# Patient Record
Sex: MALE | Race: WHITE | NOT HISPANIC OR LATINO | Employment: UNEMPLOYED | URBAN - METROPOLITAN AREA
[De-identification: names, ages, dates, MRNs, and addresses within clinical notes are randomized per-mention and may not be internally consistent; named-entity substitution may affect disease eponyms.]

---

## 2019-07-24 ENCOUNTER — TELEPHONE (OUTPATIENT)
Dept: ADMINISTRATIVE | Facility: HOSPITAL | Age: 54
End: 2019-07-24

## 2019-07-24 ENCOUNTER — PATIENT OUTREACH (OUTPATIENT)
Dept: ADMINISTRATIVE | Facility: HOSPITAL | Age: 54
End: 2019-07-24

## 2020-09-03 ENCOUNTER — HOSPITAL ENCOUNTER (EMERGENCY)
Facility: HOSPITAL | Age: 55
Discharge: HOME | End: 2020-09-03
Attending: EMERGENCY MEDICINE
Payer: MEDICAID

## 2020-09-03 VITALS
SYSTOLIC BLOOD PRESSURE: 107 MMHG | DIASTOLIC BLOOD PRESSURE: 82 MMHG | OXYGEN SATURATION: 96 % | WEIGHT: 204 LBS | BODY MASS INDEX: 32.02 KG/M2 | RESPIRATION RATE: 18 BRPM | HEIGHT: 67 IN | HEART RATE: 83 BPM | TEMPERATURE: 100 F

## 2020-09-03 DIAGNOSIS — R05.9 COUGH: Primary | ICD-10-CM

## 2020-09-03 DIAGNOSIS — R06.02 SOB (SHORTNESS OF BREATH): ICD-10-CM

## 2020-09-03 LAB — SARS-COV-2 RNA RESP QL NAA+PROBE: NOT DETECTED

## 2020-09-03 PROCEDURE — 99285 EMERGENCY DEPT VISIT HI MDM: CPT | Mod: 25

## 2020-09-03 PROCEDURE — 25000242 PHARM REV CODE 250 ALT 637 W/ HCPCS: Performed by: EMERGENCY MEDICINE

## 2020-09-03 PROCEDURE — U0002 COVID-19 LAB TEST NON-CDC: HCPCS

## 2020-09-03 PROCEDURE — 93010 EKG 12-LEAD: ICD-10-PCS | Mod: ,,, | Performed by: INTERNAL MEDICINE

## 2020-09-03 PROCEDURE — 93010 ELECTROCARDIOGRAM REPORT: CPT | Mod: ,,, | Performed by: INTERNAL MEDICINE

## 2020-09-03 PROCEDURE — 93005 ELECTROCARDIOGRAM TRACING: CPT

## 2020-09-03 RX ORDER — ALBUTEROL SULFATE 90 UG/1
4 AEROSOL, METERED RESPIRATORY (INHALATION) ONCE
Status: COMPLETED | OUTPATIENT
Start: 2020-09-03 | End: 2020-09-03

## 2020-09-03 RX ORDER — FLUTICASONE PROPIONATE 50 MCG
1 SPRAY, SUSPENSION (ML) NASAL 2 TIMES DAILY PRN
Qty: 15 G | Refills: 0 | Status: SHIPPED | OUTPATIENT
Start: 2020-09-03 | End: 2022-05-10

## 2020-09-03 RX ORDER — BENZONATATE 100 MG/1
100 CAPSULE ORAL 3 TIMES DAILY PRN
Qty: 20 CAPSULE | Refills: 0 | Status: SHIPPED | OUTPATIENT
Start: 2020-09-03 | End: 2020-09-13

## 2020-09-03 RX ORDER — NALOXONE HCL 0.4 MG/ML
VIAL (ML) INJECTION
Status: COMPLETED
Start: 2020-09-03 | End: 2020-09-03

## 2020-09-03 RX ADMIN — ALBUTEROL SULFATE 4 PUFF: 90 AEROSOL, METERED RESPIRATORY (INHALATION) at 08:09

## 2020-09-03 NOTE — ED PROVIDER NOTES
Encounter Date: 9/3/2020       History     Chief Complaint   Patient presents with    Cough     It started out with an itchy throat and chest congestion.       HPI     55 y.o.male with PMHx tobacco abuse presents with cough, SOB, nasal congestion x 3 days. Pt reports he felt congested and had an itchy throat three days ago and reports having worsening chest congestion, cough, and some chills in the last day and was barely able to sleep last night.  He reports living with his elderly parents who are both not sick.  He reports coughing up clear sputum, and took an deena seltzer with some relief.  He reports otherwise not taking any medications or any further OTC medications.  He denies any chest pain, n/v/d/c, abdominal pain, lightheadedness/dizziness, headache, rashes, or any further complaints.    Review of patient's allergies indicates:  No Known Allergies  History reviewed. No pertinent past medical history.  Past Surgical History:   Procedure Laterality Date    APPENDECTOMY      COLONOSCOPY N/A 9/30/2016    Procedure: COLONOSCOPY;  Surgeon: Luis Bogran-Reyes, MD;  Location: [facility];  Service: Endoscopy;  Laterality: N/A;    HERNIA REPAIR       Family History   Problem Relation Age of Onset    Cancer Mother     Diabetes Father     Hypertension Father      Social History     Tobacco Use    Smoking status: Former Smoker     Start date: 1988    Smokeless tobacco: Current User     Types: Snuff    Tobacco comment: social   Substance Use Topics    Alcohol use: Yes     Alcohol/week: 0.0 standard drinks     Comment: occasional    Drug use: No     Review of Systems   Constitutional: Positive for chills.   HENT: Positive for congestion.    Eyes: Negative.    Respiratory: Positive for cough and shortness of breath.    Cardiovascular: Negative.    Gastrointestinal: Negative.    Genitourinary: Negative.    Musculoskeletal: Negative.    Skin: Negative.    Neurological: Negative.        Physical Exam     Initial  Vitals   BP Pulse Resp Temp SpO2   09/03/20 0803 09/03/20 0801 09/03/20 0801 09/03/20 0801 09/03/20 0801   107/82 83 18 99.7 °F (37.6 °C) (!) 94 %      MAP       --                Physical Exam    Nursing note and vitals reviewed.  Constitutional: He appears well-developed and well-nourished. He is not diaphoretic. No distress.   HENT:   Head: Normocephalic and atraumatic.   Nose: Nose normal.   Mouth/Throat: No oropharyngeal exudate.   Eyes: EOM are normal. Pupils are equal, round, and reactive to light.   Neck: Normal range of motion. Neck supple. No tracheal deviation present. No JVD present.   Cardiovascular: Normal rate, regular rhythm and normal heart sounds.   No murmur heard.  Pulmonary/Chest: No respiratory distress. He has wheezes (mild noted bilateral lower lobes). He has no rhonchi. He has no rales.   Abdominal: Soft. Bowel sounds are normal. He exhibits no distension. There is no abdominal tenderness. There is no rebound and no guarding.   Musculoskeletal: Normal range of motion. No tenderness or edema.   Neurological: He is alert and oriented to person, place, and time. He has normal strength. No cranial nerve deficit.   Skin: Skin is warm and dry. Capillary refill takes less than 2 seconds. No rash noted. No erythema.         ED Course   Procedures  Labs Reviewed   SARS-COV-2 (COVID-19) QUALITATIVE PCR    Narrative:     Is this needed for pre-procedure or pre-op testing?->No          Imaging Results          X-Ray Chest 1 View (Final result)  Result time 09/03/20 09:10:20    Final result by Dandy Zapata MD (09/03/20 09:10:20)                 Impression:      No acute finding detected.      Electronically signed by: Dandy Zapata MD  Date:    09/03/2020  Time:    09:10             Narrative:    EXAMINATION:  XR CHEST 1 VIEW    CLINICAL HISTORY:  Shortness of breath    TECHNIQUE:  Portable AP chest    COMPARISON:  No priors available    FINDINGS:  No cardiac silhouette enlargement. Pulmonary vasculature  unremarkable. No consolidation or evidence of pneumothorax. No acute osseous change.                                                MDM:    55 y.o.male with PMHx tobacco abuse presents with cough, SOB, nasal congestion x 3 days.  Physical exam remarkable for well appearing male, in NAD, conversign with ease, mild wheezing noted in bilateral lower lobes, no peripheral edema noted, no JVD noted.  ED workup remarkable for EKG - nsr, rate 69 bpm, no ischemic pattern noted, no STEMI, CXR - unremarkable, COVID - negative.  Pt presentation consistent with viral URI with nasal congestion and relief after albuterol administration via hfa. Pt advised on further sxm therapies at home including further use of albuterol hfa.  At this time given patient's history, physical exam, and ED workup do not suspect arrhythmia, CHF exacerbation, COPD exacerbation, cardiac tamponade, MI/ACS, PE, PTX, aortic dissection, pericarditis, PNA, or any further malignant cause. Discussed diagnosis and further treatment with patient, return precautions given and all questions answered.  Patient in understanding of plan.  Pt discharged to home improved and stable.                        Clinical Impression:       ICD-10-CM ICD-9-CM   1. Cough  R05 786.2   2. SOB (shortness of breath)  R06.02 786.05             ED Disposition Condition    Discharge Stable        ED Prescriptions     Medication Sig Dispense Start Date End Date Auth. Provider    xqbmislpr-KZ-bdgntfkm-guaifen (SUDAFED PE PRESSURE-PAIN-COLD) 5--100 mg Tab Take 1 tablet by mouth every 8 (eight) hours. for 10 days 30 each 9/3/2020 9/13/2020 Ivan Ingram MD    fluticasone propionate (FLONASE) 50 mcg/actuation nasal spray 1 spray (50 mcg total) by Each Nostril route 2 (two) times daily as needed for Rhinitis. 15 g 9/3/2020  Ivan Ingram MD    benzonatate (TESSALON) 100 MG capsule Take 1 capsule (100 mg total) by mouth 3 (three) times daily as needed for Cough. 20 capsule  9/3/2020 9/13/2020 Ivan Ingram MD        Follow-up Information     Follow up With Specialties Details Why Contact Info Additional Information    [facility] - Emergency Department Emergency Medicine Go to  If symptoms worsen [address] [place] [address] [id number] [phone] Floor 1    Kaleigh Mueller MD Family Medicine, Pediatrics, Emergency Medicine Schedule an appointment as soon as possible for a visit in 3 days As needed [address] [facility] [address]  [phone]                                        Ivan Ingram MD  09/03/20 1017

## 2020-09-03 NOTE — ED NOTES
NEUROLOGICAL:   Patient is awake , alert  and oriented x 4 . Pupils are PERRL. Gait is steady.   Moves all extremities without difficulty.   Patient reports no neuro complaints..  GCS 15    HEENT:   Head appears normocephalic  and symmetric .   Eyes appear WNL to both eyes. Patient reports no complaints  to both eyes .   Ears appear WNL. Patient reports no complaints  to both ears.   Nares appear patent . Patient reports no nose complaints .  Mouth appears moist, pink and teeth intact. Patient reports no mouth complaints.   Throat appears pink and moist . Patient reports sore throat.    CARDIOVASCULAR:   S1 and S2 present, no murmurs, gallops, or rubs, rate regular  and pulses palpable (2+)    On palpation no edema noted , noted to none.   Patient reports no CV complaints.  .   Patient vitals are WNL.    RESPIRATORY:   Airway Clear, Open, and Patent.  Respirations are even and unlabored.   Breath sounds clear  to all lung fields.   Patient reports productive cough.     GASTROINTESTINAL:   Abdomen is soft  and non-tender x 4 quadrants. Bowel sounds are normoactive to all quadrants .   Patient reports no GI complaints .     GENITOURINARY:   Patient reports no  complaints.     MUSCULOSKELETAL:   full range of motion to all extremities, no swelling noted , no tenderness noted and no weakness noted.   Patient reports no musculoskeletal complaints     SKIN:   Skin appears warm , dry , good turgor, color normal for race and intact. Patient reports no skin complaints.

## 2021-04-24 ENCOUNTER — HOSPITAL ENCOUNTER (EMERGENCY)
Facility: HOSPITAL | Age: 56
Discharge: HOME | End: 2021-04-24
Attending: EMERGENCY MEDICINE
Payer: MEDICAID

## 2021-04-24 VITALS
SYSTOLIC BLOOD PRESSURE: 121 MMHG | HEART RATE: 89 BPM | BODY MASS INDEX: 33.49 KG/M2 | DIASTOLIC BLOOD PRESSURE: 85 MMHG | OXYGEN SATURATION: 97 % | TEMPERATURE: 99 F | WEIGHT: 208.38 LBS | RESPIRATION RATE: 17 BRPM | HEIGHT: 66 IN

## 2021-04-24 DIAGNOSIS — S93.402A SPRAIN OF LEFT ANKLE, UNSPECIFIED LIGAMENT, INITIAL ENCOUNTER: Primary | ICD-10-CM

## 2021-04-24 DIAGNOSIS — R52 PAIN: ICD-10-CM

## 2021-04-24 PROCEDURE — 99283 EMERGENCY DEPT VISIT LOW MDM: CPT | Mod: 25

## 2021-05-06 ENCOUNTER — PATIENT MESSAGE (OUTPATIENT)
Dept: RESEARCH | Facility: HOSPITAL | Age: 56
End: 2021-05-06

## 2021-06-03 ENCOUNTER — HOSPITAL ENCOUNTER (EMERGENCY)
Facility: HOSPITAL | Age: 56
Discharge: HOME | End: 2021-06-03
Attending: EMERGENCY MEDICINE
Payer: MEDICAID

## 2021-06-03 VITALS
HEART RATE: 74 BPM | TEMPERATURE: 98 F | BODY MASS INDEX: 33.75 KG/M2 | RESPIRATION RATE: 18 BRPM | SYSTOLIC BLOOD PRESSURE: 116 MMHG | WEIGHT: 210 LBS | OXYGEN SATURATION: 96 % | DIASTOLIC BLOOD PRESSURE: 77 MMHG | HEIGHT: 66 IN

## 2021-06-03 DIAGNOSIS — S92.416A: Primary | ICD-10-CM

## 2021-06-03 PROCEDURE — 99283 EMERGENCY DEPT VISIT LOW MDM: CPT | Mod: 25

## 2021-06-03 RX ORDER — TRAMADOL HYDROCHLORIDE 50 MG/1
50 TABLET ORAL EVERY 6 HOURS PRN
Qty: 12 TABLET | Refills: 0 | Status: SHIPPED | OUTPATIENT
Start: 2021-06-03 | End: 2022-05-10

## 2022-02-07 ENCOUNTER — HOSPITAL ENCOUNTER (EMERGENCY)
Facility: HOSPITAL | Age: 57
Discharge: HOME | End: 2022-02-07
Attending: EMERGENCY MEDICINE
Payer: MEDICAID

## 2022-02-07 VITALS
WEIGHT: 223 LBS | BODY MASS INDEX: 35.84 KG/M2 | HEART RATE: 69 BPM | SYSTOLIC BLOOD PRESSURE: 157 MMHG | OXYGEN SATURATION: 98 % | DIASTOLIC BLOOD PRESSURE: 93 MMHG | TEMPERATURE: 99 F | HEIGHT: 66 IN | RESPIRATION RATE: 18 BRPM

## 2022-02-07 DIAGNOSIS — T14.90XA INJURY: ICD-10-CM

## 2022-02-07 DIAGNOSIS — M54.50 MIDLINE LOW BACK PAIN WITHOUT SCIATICA: ICD-10-CM

## 2022-02-07 DIAGNOSIS — S93.401A SPRAIN OF RIGHT ANKLE, UNSPECIFIED LIGAMENT, INITIAL ENCOUNTER: Primary | ICD-10-CM

## 2022-02-07 PROCEDURE — 99283 EMERGENCY DEPT VISIT LOW MDM: CPT | Mod: 25

## 2022-02-07 RX ORDER — NAPROXEN 500 MG/1
500 TABLET ORAL 2 TIMES DAILY WITH MEALS
Qty: 10 TABLET | Refills: 0 | Status: SHIPPED | OUTPATIENT
Start: 2022-02-07 | End: 2022-02-12

## 2022-02-07 NOTE — Clinical Note
"Bob Christiansen" Mango was seen and treated in our emergency department on 2/7/2022.  He may return to work on 02/08/2022.       If you have any questions or concerns, please don't hesitate to call.      Haleigh Slater NP"

## 2022-02-07 NOTE — ED PROVIDER NOTES
Encounter Date: 2/7/2022       History     Chief Complaint   Patient presents with    Ankle Pain     Pt stated that he has been having right ankle pain for the past month. About a week ago, he was coming off of his step and rolled right ankle. Presents to ED for complaints of pain.      This is a 56-year-old white male with noncontributory past medical history who presents to the emergency department with complaints of right ankle pain.  Patient relates that over the last month he has been experiencing intermittent, nontraumatic right ankle pain that became worse 1 week ago after twisting it .  Patient reports pain to the dorsal and medial aspect of the right ankle is worse with ambulation.  Patient denies any other concerns at this time.        Review of patient's allergies indicates:  No Known Allergies  History reviewed. No pertinent past medical history.  Past Surgical History:   Procedure Laterality Date    APPENDECTOMY      COLONOSCOPY N/A 9/30/2016    Procedure: COLONOSCOPY;  Surgeon: Luis Bogran-Reyes, MD;  Location: [facility];  Service: Endoscopy;  Laterality: N/A;    HERNIA REPAIR       Family History   Problem Relation Age of Onset    Cancer Mother     Diabetes Father     Hypertension Father      Social History     Tobacco Use    Smoking status: Former Smoker     Start date: 1988    Smokeless tobacco: Current User     Types: Snuff    Tobacco comment: social   Substance Use Topics    Alcohol use: Yes     Alcohol/week: 0.0 standard drinks     Comment: occasional    Drug use: No     Review of Systems   Constitutional: Negative.    Respiratory: Negative.    Cardiovascular: Negative.    Musculoskeletal: Positive for arthralgias and gait problem.   Neurological: Negative for numbness.       Physical Exam     Initial Vitals [02/07/22 0924]   BP Pulse Resp Temp SpO2   (!) 157/93 69 18 98.9 °F (37.2 °C) 98 %      MAP       --         Physical Exam    Nursing note and vitals reviewed.  Constitutional:  He appears well-developed and well-nourished. He is active. No distress.   HENT:   Head: Normocephalic and atraumatic.   Eyes: EOM are normal. Pupils are equal, round, and reactive to light.   Neck: Neck supple.   Normal range of motion.  Cardiovascular: Normal rate.   Pulmonary/Chest: No respiratory distress.   Musculoskeletal:         General: No tenderness or edema. Normal range of motion.      Cervical back: Normal range of motion and neck supple.      Comments: The right ankle appears normal upon inspection, no rash or discoloration noted.  Patient is nontender to palpation of the right ankle and displays full range of motion.  Dorsalis pedis pulses 2+ and capillary refills less than 2 seconds.     Neurological: He is alert and oriented to person, place, and time. GCS score is 15. GCS eye subscore is 4. GCS verbal subscore is 5. GCS motor subscore is 6.   Skin: Skin is warm and dry. Capillary refill takes less than 2 seconds.   Psychiatric: He has a normal mood and affect. His behavior is normal. Thought content normal.         ED Course   Procedures  Labs Reviewed - No data to display       Imaging Results          X-Ray Ankle Complete Right (In process)                  Medications - No data to display              ED Course as of 02/07/22 1005   Mon Feb 07, 2022   1003 No acute finding on right ankle x-ray [CB]      ED Course User Index  [CB] Haleigh Slater NP             Clinical Impression:   Final diagnoses:  [T14.90XA] Injury  [S93.401A] Sprain of right ankle, unspecified ligament, initial encounter (Primary)          ED Disposition Condition    Discharge Stable        ED Prescriptions     Medication Sig Dispense Start Date End Date Auth. Provider    naproxen (NAPROSYN) 500 MG tablet Take 1 tablet (500 mg total) by mouth 2 (two) times daily with meals. for 5 days 10 tablet 2/7/2022 2/12/2022 Haleigh Slater NP        Follow-up Information     Follow up With Specialties Details Why Contact Info     PCP Follow UP  Call in 2 days for follow-up, for re-evaluation of today's complaint            Haleigh Slater NP  02/07/22 [id number]

## 2022-02-07 NOTE — DISCHARGE INSTRUCTIONS
Take medication as directed, and you may also take Tylenol as directed for pain.  Apply ice to help reduce pain.  See your primary doctor in 1-2 days, and return to the emergency room for worsening condition.

## 2022-05-10 ENCOUNTER — OFFICE VISIT (OUTPATIENT)
Dept: PRIMARY CARE CLINIC | Facility: CLINIC | Age: 57
End: 2022-05-10
Payer: MEDICAID

## 2022-05-10 VITALS
DIASTOLIC BLOOD PRESSURE: 67 MMHG | OXYGEN SATURATION: 96 % | HEART RATE: 80 BPM | BODY MASS INDEX: 35.91 KG/M2 | HEIGHT: 66 IN | SYSTOLIC BLOOD PRESSURE: 136 MMHG | TEMPERATURE: 98 F | WEIGHT: 223.44 LBS

## 2022-05-10 DIAGNOSIS — E66.9 OBESITY (BMI 30-39.9): Primary | ICD-10-CM

## 2022-05-10 DIAGNOSIS — Z13.220 ENCOUNTER FOR LIPID SCREENING FOR CARDIOVASCULAR DISEASE: ICD-10-CM

## 2022-05-10 DIAGNOSIS — Z13.6 ENCOUNTER FOR LIPID SCREENING FOR CARDIOVASCULAR DISEASE: ICD-10-CM

## 2022-05-10 DIAGNOSIS — Z11.59 ENCOUNTER FOR HEPATITIS C SCREENING TEST FOR LOW RISK PATIENT: ICD-10-CM

## 2022-05-10 DIAGNOSIS — Z12.11 SCREEN FOR COLON CANCER: ICD-10-CM

## 2022-05-10 DIAGNOSIS — M54.50 CHRONIC BILATERAL LOW BACK PAIN WITHOUT SCIATICA: ICD-10-CM

## 2022-05-10 DIAGNOSIS — G89.29 CHRONIC BILATERAL LOW BACK PAIN WITHOUT SCIATICA: ICD-10-CM

## 2022-05-10 LAB
ALBUMIN SERPL BCP-MCNC: 3.4 G/DL (ref 3.5–5.2)
ALP SERPL-CCNC: 54 U/L (ref 55–135)
ALT SERPL W/O P-5'-P-CCNC: 175 U/L (ref 10–44)
ANION GAP SERPL CALC-SCNC: 3 MMOL/L (ref 8–16)
AST SERPL-CCNC: 96 U/L (ref 10–40)
BASOPHILS # BLD AUTO: 0.05 K/UL (ref 0–0.2)
BASOPHILS NFR BLD: 0.7 % (ref 0–1.9)
BILIRUB SERPL-MCNC: 0.7 MG/DL (ref 0.1–1)
BUN SERPL-MCNC: 13 MG/DL (ref 6–20)
CALCIUM SERPL-MCNC: 9.1 MG/DL (ref 8.7–10.5)
CHLORIDE SERPL-SCNC: 110 MMOL/L (ref 95–110)
CHOLEST SERPL-MCNC: 136 MG/DL (ref 120–199)
CHOLEST/HDLC SERPL: 3.5 {RATIO} (ref 2–5)
CO2 SERPL-SCNC: 30 MMOL/L (ref 23–29)
COMPLEXED PSA SERPL-MCNC: 0.37 NG/ML (ref 0–4)
CREAT SERPL-MCNC: 1.1 MG/DL (ref 0.5–1.4)
DIFFERENTIAL METHOD: ABNORMAL
EOSINOPHIL # BLD AUTO: 0.3 K/UL (ref 0–0.5)
EOSINOPHIL NFR BLD: 3.8 % (ref 0–8)
ERYTHROCYTE [DISTWIDTH] IN BLOOD BY AUTOMATED COUNT: 12.7 % (ref 11.5–14.5)
EST. GFR  (AFRICAN AMERICAN): >60 ML/MIN/1.73 M^2
EST. GFR  (NON AFRICAN AMERICAN): >60 ML/MIN/1.73 M^2
GLUCOSE SERPL-MCNC: 100 MG/DL (ref 70–110)
HCT VFR BLD AUTO: 43 % (ref 40–54)
HDLC SERPL-MCNC: 39 MG/DL (ref 40–75)
HDLC SERPL: 28.7 % (ref 20–50)
HGB BLD-MCNC: 14.1 G/DL (ref 14–18)
IMM GRANULOCYTES # BLD AUTO: 0.02 K/UL (ref 0–0.04)
IMM GRANULOCYTES NFR BLD AUTO: 0.3 % (ref 0–0.5)
LDLC SERPL CALC-MCNC: 63.8 MG/DL (ref 63–159)
LYMPHOCYTES # BLD AUTO: 2.7 K/UL (ref 1–4.8)
LYMPHOCYTES NFR BLD: 39.5 % (ref 18–48)
MCH RBC QN AUTO: 31.1 PG (ref 27–31)
MCHC RBC AUTO-ENTMCNC: 32.8 G/DL (ref 32–36)
MCV RBC AUTO: 95 FL (ref 82–98)
MONOCYTES # BLD AUTO: 0.6 K/UL (ref 0.3–1)
MONOCYTES NFR BLD: 8.2 % (ref 4–15)
NEUTROPHILS # BLD AUTO: 3.2 K/UL (ref 1.8–7.7)
NEUTROPHILS NFR BLD: 47.5 % (ref 38–73)
NONHDLC SERPL-MCNC: 97 MG/DL
NRBC BLD-RTO: 0 /100 WBC
PLATELET # BLD AUTO: 218 K/UL (ref 150–450)
PMV BLD AUTO: 10.4 FL (ref 9.2–12.9)
POTASSIUM SERPL-SCNC: 4.1 MMOL/L (ref 3.5–5.1)
PROT SERPL-MCNC: 7.1 G/DL (ref 6–8.4)
RBC # BLD AUTO: 4.53 M/UL (ref 4.6–6.2)
SODIUM SERPL-SCNC: 143 MMOL/L (ref 136–145)
TRIGL SERPL-MCNC: 166 MG/DL (ref 30–150)
WBC # BLD AUTO: 6.79 K/UL (ref 3.9–12.7)

## 2022-05-10 PROCEDURE — 80053 COMPREHEN METABOLIC PANEL: CPT | Performed by: STUDENT IN AN ORGANIZED HEALTH CARE EDUCATION/TRAINING PROGRAM

## 2022-05-10 PROCEDURE — 86803 HEPATITIS C AB TEST: CPT | Performed by: STUDENT IN AN ORGANIZED HEALTH CARE EDUCATION/TRAINING PROGRAM

## 2022-05-10 PROCEDURE — 99203 PR OFFICE/OUTPT VISIT, NEW, LEVL III, 30-44 MIN: ICD-10-PCS | Mod: S$PBB,,, | Performed by: STUDENT IN AN ORGANIZED HEALTH CARE EDUCATION/TRAINING PROGRAM

## 2022-05-10 PROCEDURE — 85025 COMPLETE CBC W/AUTO DIFF WBC: CPT | Performed by: STUDENT IN AN ORGANIZED HEALTH CARE EDUCATION/TRAINING PROGRAM

## 2022-05-10 PROCEDURE — 3075F SYST BP GE 130 - 139MM HG: CPT | Mod: CPTII,,, | Performed by: STUDENT IN AN ORGANIZED HEALTH CARE EDUCATION/TRAINING PROGRAM

## 2022-05-10 PROCEDURE — 80061 LIPID PANEL: CPT | Performed by: STUDENT IN AN ORGANIZED HEALTH CARE EDUCATION/TRAINING PROGRAM

## 2022-05-10 PROCEDURE — 3078F PR MOST RECENT DIASTOLIC BLOOD PRESSURE < 80 MM HG: ICD-10-PCS | Mod: CPTII,,, | Performed by: STUDENT IN AN ORGANIZED HEALTH CARE EDUCATION/TRAINING PROGRAM

## 2022-05-10 PROCEDURE — 3075F PR MOST RECENT SYSTOLIC BLOOD PRESS GE 130-139MM HG: ICD-10-PCS | Mod: CPTII,,, | Performed by: STUDENT IN AN ORGANIZED HEALTH CARE EDUCATION/TRAINING PROGRAM

## 2022-05-10 PROCEDURE — 99213 OFFICE O/P EST LOW 20 MIN: CPT | Mod: PBBFAC,PN | Performed by: STUDENT IN AN ORGANIZED HEALTH CARE EDUCATION/TRAINING PROGRAM

## 2022-05-10 PROCEDURE — 3078F DIAST BP <80 MM HG: CPT | Mod: CPTII,,, | Performed by: STUDENT IN AN ORGANIZED HEALTH CARE EDUCATION/TRAINING PROGRAM

## 2022-05-10 PROCEDURE — 99999 PR PBB SHADOW E&M-EST. PATIENT-LVL III: ICD-10-PCS | Mod: PBBFAC,,, | Performed by: STUDENT IN AN ORGANIZED HEALTH CARE EDUCATION/TRAINING PROGRAM

## 2022-05-10 PROCEDURE — 1159F PR MEDICATION LIST DOCUMENTED IN MEDICAL RECORD: ICD-10-PCS | Mod: CPTII,,, | Performed by: STUDENT IN AN ORGANIZED HEALTH CARE EDUCATION/TRAINING PROGRAM

## 2022-05-10 PROCEDURE — 99203 OFFICE O/P NEW LOW 30 MIN: CPT | Mod: S$PBB,,, | Performed by: STUDENT IN AN ORGANIZED HEALTH CARE EDUCATION/TRAINING PROGRAM

## 2022-05-10 PROCEDURE — 1159F MED LIST DOCD IN RCRD: CPT | Mod: CPTII,,, | Performed by: STUDENT IN AN ORGANIZED HEALTH CARE EDUCATION/TRAINING PROGRAM

## 2022-05-10 PROCEDURE — 3008F BODY MASS INDEX DOCD: CPT | Mod: CPTII,,, | Performed by: STUDENT IN AN ORGANIZED HEALTH CARE EDUCATION/TRAINING PROGRAM

## 2022-05-10 PROCEDURE — 3008F PR BODY MASS INDEX (BMI) DOCUMENTED: ICD-10-PCS | Mod: CPTII,,, | Performed by: STUDENT IN AN ORGANIZED HEALTH CARE EDUCATION/TRAINING PROGRAM

## 2022-05-10 PROCEDURE — 84153 ASSAY OF PSA TOTAL: CPT | Performed by: STUDENT IN AN ORGANIZED HEALTH CARE EDUCATION/TRAINING PROGRAM

## 2022-05-10 PROCEDURE — 99999 PR PBB SHADOW E&M-EST. PATIENT-LVL III: CPT | Mod: PBBFAC,,, | Performed by: STUDENT IN AN ORGANIZED HEALTH CARE EDUCATION/TRAINING PROGRAM

## 2022-05-10 RX ORDER — DICLOFENAC SODIUM 30 MG/G
GEL TOPICAL
Qty: 100 G | Refills: 1 | Status: SHIPPED | OUTPATIENT
Start: 2022-05-10 | End: 2023-08-10

## 2022-05-10 RX ORDER — DEXTROMETHORPHAN HYDROBROMIDE, GUAIFENESIN 5; 100 MG/5ML; MG/5ML
650 LIQUID ORAL EVERY 8 HOURS
Qty: 42 TABLET | Refills: 0 | Status: SHIPPED | OUTPATIENT
Start: 2022-05-10 | End: 2022-05-24

## 2022-05-10 NOTE — PROGRESS NOTES
"[facility] Note    Chief Complaint      Chief Complaint   Patient presents with    Establish Care     Patient c/o "hernia" he points to his mid abdomen.  He states it has been there for about a year or a little longer,  he states no pain.      History of Present Illness      Bob Cobian is a 56 y.o. male who presents today for Establish Care (Patient c/o "hernia" he points to his mid abdomen.  He states it has been there for about a year or a little longer,  he states no pain. )  Overall in good health with primary concern for protuberant abdomen above umbilicus. Explained to patient that what he has is a diastasis with thinning of abdominal wall, but no hernia.     Health Maintenance:  Last colonoscopy 9/2016, per patient, no abnormal findings.   Patient cannot recall the last time he had blood work done.     Past Medical History:  History reviewed. No pertinent past medical history.    Past Surgical History:   has a past surgical history that includes Hernia repair; Appendectomy; and Colonoscopy (N/A, 9/30/2016).    Family History:  family history includes Cancer in his mother; Diabetes in his father; Hypertension in his father.     Social History:  Social History     Tobacco Use    Smoking status: Former Smoker     Types: Cigarettes     Start date: 1988    Smokeless tobacco: Current User     Types: Snuff    Tobacco comment: social   Substance Use Topics    Alcohol use: Yes     Alcohol/week: 0.0 standard drinks     Comment: occasional    Drug use: No       I personally reviewed all past medical, surgical, social and family history.    Review of Systems   Constitutional: Negative for chills, fever, malaise/fatigue and weight loss.   HENT: Negative for congestion, ear discharge, ear pain, sinus pain and sore throat.    Eyes: Negative for blurred vision, pain, discharge and redness.   Respiratory: Negative for cough, hemoptysis, sputum production, shortness of breath, wheezing and " stridor.    Cardiovascular: Negative for chest pain, palpitations and leg swelling.   Gastrointestinal: Negative for abdominal pain, blood in stool, constipation, diarrhea, nausea and vomiting.   Genitourinary: Negative for dysuria, frequency and hematuria.   Musculoskeletal: Negative for back pain, falls, joint pain, myalgias and neck pain.   Skin: Negative.  Negative for rash.   Neurological: Negative for dizziness, tingling, focal weakness, seizures, weakness and headaches.   Endo/Heme/Allergies: Negative for environmental allergies and polydipsia. Does not bruise/bleed easily.   Psychiatric/Behavioral: Negative for depression and suicidal ideas. The patient is not nervous/anxious.         Medications:  Outpatient Encounter Medications as of 5/10/2022   Medication Sig Dispense Refill    diclofenac (VOLTAREN) 75 MG EC tablet Take 1 tablet (75 mg total) by mouth 3 (three) times daily. 21 tablet 0    acetaminophen (TYLENOL) 650 MG TbSR Take 1 tablet (650 mg total) by mouth every 8 (eight) hours. for 14 days 42 tablet 0    diclofenac sodium (SOLARAZE) 3 % gel Apply over affected area in lower back. 100 g 1    [DISCONTINUED] fluticasone propionate (FLONASE) 50 mcg/actuation nasal spray 1 spray (50 mcg total) by Each Nostril route 2 (two) times daily as needed for Rhinitis. 15 g 0    [DISCONTINUED] HYDROcodone-acetaminophen (NORCO) 5-325 mg per tablet Take 1 tablet by mouth every 4 (four) hours as needed. 11 tablet 0    [DISCONTINUED] sildenafil (VIAGRA) 100 MG tablet Take 1 tablet (100 mg total) by mouth daily as needed for Erectile Dysfunction. 15 tablet 2    [DISCONTINUED] traMADoL (ULTRAM) 50 mg tablet Take 1 tablet (50 mg total) by mouth every 6 (six) hours as needed for Pain. 12 tablet 0     No facility-administered encounter medications on file as of 5/10/2022.     Allergies:  Review of patient's allergies indicates:  No Known Allergies    Health Maintenance:  Immunization History   Administered Date(s)  "Administered    Influenza - Quadrivalent - PF *Preferred* (6 months and older) 01/26/2016, 12/19/2017    Td - PF (ADULT) 12/19/2017      Health Maintenance   Topic Date Due    Hepatitis C Screening  Never done    Lipid Panel  05/10/2027    TETANUS VACCINE  12/19/2027     Health Maintenance Topics with due status: Not Due       Topic Last Completion Date    Colorectal Cancer Screening 09/30/2016    TETANUS VACCINE 12/19/2017    Influenza Vaccine 09/24/2020    Lipid Panel 05/10/2022     Health Maintenance Due   Topic Date Due    Hepatitis C Screening  Never done    COVID-19 Vaccine (1) Never done    HIV Screening  Never done    Shingles Vaccine (1 of 2) Never done     Physical Exam      Vital Signs  Temp: 98.4 °F (36.9 °C)  Temp src: Oral  Pulse: 80  SpO2: 96 %  BP: 136/67  BP Location: Left arm  Patient Position: Sitting  Pain Score: 0-No pain  Height and Weight  Height: 5' 6" (167.6 cm)  Weight: 101.4 kg (223 lb 7 oz)  BSA (Calculated - sq m): 2.17 sq meters  BMI (Calculated): 36.1  Weight in (lb) to have BMI = 25: 154.6]    Physical Exam  Vitals and nursing note reviewed.   Constitutional:       General: He is not in acute distress.     Appearance: Normal appearance. He is not toxic-appearing.   HENT:      Head: Normocephalic and atraumatic.      Right Ear: External ear normal.      Left Ear: External ear normal.      Nose: Nose normal.      Mouth/Throat:      Mouth: Mucous membranes are moist.      Pharynx: Oropharynx is clear.   Eyes:      Extraocular Movements: Extraocular movements intact.      Conjunctiva/sclera: Conjunctivae normal.   Cardiovascular:      Rate and Rhythm: Normal rate and regular rhythm.      Pulses: Normal pulses.      Heart sounds: Normal heart sounds. No murmur heard.  Pulmonary:      Effort: Pulmonary effort is normal. No respiratory distress.      Breath sounds: Normal breath sounds. No wheezing or rales.   Abdominal:      General: Abdomen is flat. Bowel sounds are normal.      " Palpations: Abdomen is soft. There is no mass.      Tenderness: There is no right CVA tenderness or left CVA tenderness.   Musculoskeletal:         General: Normal range of motion.      Cervical back: Normal range of motion and neck supple. No rigidity.      Right lower leg: No edema.      Left lower leg: No edema.   Skin:     General: Skin is warm and dry.      Capillary Refill: Capillary refill takes less than 2 seconds.   Neurological:      General: No focal deficit present.      Mental Status: He is alert and oriented to person, place, and time.      Motor: No weakness.      Gait: Gait normal.   Psychiatric:         Mood and Affect: Mood normal.         Behavior: Behavior normal.        Assessment/Plan     Bob Cobian is a 56 y.o.male with:    Problem List Items Addressed This Visit        Cardiac/Vascular    Encounter for lipid screening for cardiovascular disease    Relevant Orders    Comprehensive Metabolic Panel (Completed)    CBC Auto Differential (Completed)    Lipid Panel (Completed)       ID    Encounter for hepatitis C screening test for low risk patient    Relevant Orders    Hepatitis C Antibody       Endocrine    BMI 36.0-36.9,adult - Primary    Current Assessment & Plan     Wt Readings from Last 3 Encounters:   05/10/22 1400 101.4 kg (223 lb 7 oz)   05/05/22 0838 99.8 kg (220 lb)   02/07/22 0924 101.2 kg (223 lb)   Recommendations:   Stay physically active. As tolerated alternate resistance training with stretching and cardio. Goal of 150 minutes per week of moderate intensity activity or 7,500 - 10,000 steps per day. Follow the Mediterranean Diet. Include whole fresh fruits, vegetables, olive oil, seeds, nuts, whole grains, cold water fish, salmon, mackerel and lean cuts of meat.  Do not drink sugary/diet carbonated beverages. Decrease portion sizes slightly which will result in an approximately 500-calorie deficit. Avoid fast or fried and processed food, especially canned foods. Avoid  refined carbohydrates, white starchy foods, flour, white potato, bread, muffins, and cakes. Consider substituting one meal a day with a meal replacement such as Slim fast, lean cuisine, or weight watcher's. Follow a healthy diet that includes enough calcium, vitamin D and proteins for bone health.                GI    Screen for colon cancer    Relevant Orders    PSA, Screening (Completed)       Orthopedic    Chronic bilateral low back pain without sciatica    Relevant Medications    diclofenac sodium (SOLARAZE) 3 % gel    acetaminophen (TYLENOL) 650 MG TbSR        Other than changes above, cont current medications and maintain follow up with specialists.  Follow up in about 4 months (around 9/10/2022).    Future Appointments   Date Time Provider Department Center   5/17/2022  9:15 AM Emeka Prince DO [facility]       Kazumi G Yoshinaga, DO [facility]

## 2022-05-14 PROBLEM — Z11.59 ENCOUNTER FOR HEPATITIS C SCREENING TEST FOR LOW RISK PATIENT: Status: ACTIVE | Noted: 2022-05-14

## 2022-05-14 PROBLEM — G89.29 CHRONIC BILATERAL LOW BACK PAIN WITHOUT SCIATICA: Status: ACTIVE | Noted: 2022-05-14

## 2022-05-14 PROBLEM — Z13.220 ENCOUNTER FOR LIPID SCREENING FOR CARDIOVASCULAR DISEASE: Status: ACTIVE | Noted: 2022-05-14

## 2022-05-14 PROBLEM — Z13.6 ENCOUNTER FOR LIPID SCREENING FOR CARDIOVASCULAR DISEASE: Status: ACTIVE | Noted: 2022-05-14

## 2022-05-14 PROBLEM — M54.50 CHRONIC BILATERAL LOW BACK PAIN WITHOUT SCIATICA: Status: ACTIVE | Noted: 2022-05-14

## 2022-05-14 PROBLEM — Z12.11 SCREEN FOR COLON CANCER: Status: ACTIVE | Noted: 2022-05-14

## 2022-05-14 NOTE — ASSESSMENT & PLAN NOTE
Wt Readings from Last 3 Encounters:   05/10/22 1400 101.4 kg (223 lb 7 oz)   05/05/22 0838 99.8 kg (220 lb)   02/07/22 0924 101.2 kg (223 lb)   Recommendations:   Stay physically active. As tolerated alternate resistance training with stretching and cardio. Goal of 150 minutes per week of moderate intensity activity or 7,500 - 10,000 steps per day. Follow the Mediterranean Diet. Include whole fresh fruits, vegetables, olive oil, seeds, nuts, whole grains, cold water fish, salmon, mackerel and lean cuts of meat.  Do not drink sugary/diet carbonated beverages. Decrease portion sizes slightly which will result in an approximately 500-calorie deficit. Avoid fast or fried and processed food, especially canned foods. Avoid refined carbohydrates, white starchy foods, flour, white potato, bread, muffins, and cakes. Consider substituting one meal a day with a meal replacement such as Slim fast, lean cuisine, or weight watcher's. Follow a healthy diet that includes enough calcium, vitamin D and proteins for bone health.

## 2022-05-17 ENCOUNTER — OFFICE VISIT (OUTPATIENT)
Dept: PRIMARY CARE CLINIC | Facility: CLINIC | Age: 57
End: 2022-05-17
Payer: MEDICAID

## 2022-05-17 VITALS
TEMPERATURE: 99 F | DIASTOLIC BLOOD PRESSURE: 85 MMHG | BODY MASS INDEX: 35.65 KG/M2 | WEIGHT: 221.81 LBS | HEIGHT: 66 IN | OXYGEN SATURATION: 94 % | SYSTOLIC BLOOD PRESSURE: 136 MMHG | HEART RATE: 72 BPM

## 2022-05-17 DIAGNOSIS — K59.00 CONSTIPATION, UNSPECIFIED CONSTIPATION TYPE: ICD-10-CM

## 2022-05-17 DIAGNOSIS — R79.89 ELEVATED LFTS: ICD-10-CM

## 2022-05-17 DIAGNOSIS — E78.5 HYPERLIPIDEMIA, UNSPECIFIED HYPERLIPIDEMIA TYPE: Primary | ICD-10-CM

## 2022-05-17 PROCEDURE — 3075F SYST BP GE 130 - 139MM HG: CPT | Mod: CPTII,,, | Performed by: STUDENT IN AN ORGANIZED HEALTH CARE EDUCATION/TRAINING PROGRAM

## 2022-05-17 PROCEDURE — 99999 PR PBB SHADOW E&M-EST. PATIENT-LVL III: CPT | Mod: PBBFAC,,, | Performed by: STUDENT IN AN ORGANIZED HEALTH CARE EDUCATION/TRAINING PROGRAM

## 2022-05-17 PROCEDURE — 99999 PR PBB SHADOW E&M-EST. PATIENT-LVL III: ICD-10-PCS | Mod: PBBFAC,,, | Performed by: STUDENT IN AN ORGANIZED HEALTH CARE EDUCATION/TRAINING PROGRAM

## 2022-05-17 PROCEDURE — 3079F PR MOST RECENT DIASTOLIC BLOOD PRESSURE 80-89 MM HG: ICD-10-PCS | Mod: CPTII,,, | Performed by: STUDENT IN AN ORGANIZED HEALTH CARE EDUCATION/TRAINING PROGRAM

## 2022-05-17 PROCEDURE — 3008F PR BODY MASS INDEX (BMI) DOCUMENTED: ICD-10-PCS | Mod: CPTII,,, | Performed by: STUDENT IN AN ORGANIZED HEALTH CARE EDUCATION/TRAINING PROGRAM

## 2022-05-17 PROCEDURE — 3079F DIAST BP 80-89 MM HG: CPT | Mod: CPTII,,, | Performed by: STUDENT IN AN ORGANIZED HEALTH CARE EDUCATION/TRAINING PROGRAM

## 2022-05-17 PROCEDURE — 99214 PR OFFICE/OUTPT VISIT, EST, LEVL IV, 30-39 MIN: ICD-10-PCS | Mod: S$PBB,,, | Performed by: STUDENT IN AN ORGANIZED HEALTH CARE EDUCATION/TRAINING PROGRAM

## 2022-05-17 PROCEDURE — 3075F PR MOST RECENT SYSTOLIC BLOOD PRESS GE 130-139MM HG: ICD-10-PCS | Mod: CPTII,,, | Performed by: STUDENT IN AN ORGANIZED HEALTH CARE EDUCATION/TRAINING PROGRAM

## 2022-05-17 PROCEDURE — 1159F MED LIST DOCD IN RCRD: CPT | Mod: CPTII,,, | Performed by: STUDENT IN AN ORGANIZED HEALTH CARE EDUCATION/TRAINING PROGRAM

## 2022-05-17 PROCEDURE — 1159F PR MEDICATION LIST DOCUMENTED IN MEDICAL RECORD: ICD-10-PCS | Mod: CPTII,,, | Performed by: STUDENT IN AN ORGANIZED HEALTH CARE EDUCATION/TRAINING PROGRAM

## 2022-05-17 PROCEDURE — 99213 OFFICE O/P EST LOW 20 MIN: CPT | Mod: PBBFAC,PN | Performed by: STUDENT IN AN ORGANIZED HEALTH CARE EDUCATION/TRAINING PROGRAM

## 2022-05-17 PROCEDURE — 99214 OFFICE O/P EST MOD 30 MIN: CPT | Mod: S$PBB,,, | Performed by: STUDENT IN AN ORGANIZED HEALTH CARE EDUCATION/TRAINING PROGRAM

## 2022-05-17 PROCEDURE — 3008F BODY MASS INDEX DOCD: CPT | Mod: CPTII,,, | Performed by: STUDENT IN AN ORGANIZED HEALTH CARE EDUCATION/TRAINING PROGRAM

## 2022-05-17 RX ORDER — BROMPHENIRAMINE MALEATE, DEXTROMETHORPHAN HBR, PHENYLEPHRINE HCL, DIPHENHYDRAMINE HCL, PHENYLEPHRINE HCL 0.52G
0.52 KIT ORAL DAILY
Qty: 90 CAPSULE | Refills: 1 | Status: SHIPPED | OUTPATIENT
Start: 2022-05-17 | End: 2022-05-18

## 2022-05-17 RX ORDER — AMOXICILLIN 250 MG
1 CAPSULE ORAL DAILY
Qty: 30 TABLET | Refills: 0 | Status: SHIPPED | OUTPATIENT
Start: 2022-05-17 | End: 2022-06-16

## 2022-05-17 NOTE — ASSESSMENT & PLAN NOTE
"- Your LDL "bad cholesterol" is 63.8  - Your HDL "good cholesterol" is LOW  39  - Your Triglycerides, natural fats and oils in blood is elevated at 166    - To help improve your cardiovascular health and reduce your risk of stroke or heart attack, the following healthy lifestyle changes are recommended.   - Choose whole food items, fresh/frozen fruits and vegetables and unprocessed meats.  - Avoid processed food items, cold cuts, canned foods in sugary and high sodium preservatives.   - Reduce intake of highly refined carbohydrates or white starchy foods, white bread, white flour pasta, sugary cereals, sugary drinks, sweet tea, soda including diet, candies, cakes and donuts.    - Reduce or avoid saturated fats (fats from animals and processed oils like palm oil, peanut oil, vegetable oil) and fried food items.   - Increase healthy fats like omega-3 fish oil, fatty fish (salmon, mackerel, sardine etc), olive oil, avocado, raw nuts etc.   - Increase fiber intake with daily goal of 6-8 servings of vegetables.  - Remember to maintain daily adequate hydration with water 1.5 to 2 liters fluid volume.    - Daily physical activities, start slow with 10-15 minutes of walk daily, then increase as tolerated to twice daily.   - Cardiovascular exercise also improves your cholesterol levels and your goal is low intensity (like walking and biking) 150 min/week to moderate/high intensity 75 min/week.    "

## 2022-05-17 NOTE — PROGRESS NOTES
Subjective:       Patient ID: Bob Cobian is a 56 y.o. male.    Chief Complaint: Follow-up (1 week lab follow up)    Follow-up  This is a chronic (10-15 years and requires pulling stool out by hand) problem. The problem has been unchanged. Associated symptoms include arthralgias and a change in bowel habit. Pertinent negatives include no abdominal pain, chest pain, chills, congestion, coughing, fatigue, fever, neck pain, numbness, visual change, vomiting or weakness. Nothing (hemorrhoids 1-2 times per month relieved with prepH) aggravates the symptoms. He has tried nothing for the symptoms.   Constipation  The stool is described as formed and loose. The patient is not on a high fiber diet. He does not exercise regularly. There has not been adequate water intake (redcued coke to 3 per day to 1 per day). Pertinent negatives include no abdominal pain, fever or vomiting.     Review of Systems   Constitutional: Negative for chills, fatigue and fever.   HENT: Negative for nasal congestion.    Respiratory: Negative for cough.    Cardiovascular: Negative for chest pain.   Gastrointestinal: Positive for change in bowel habit, constipation and change in bowel habit. Negative for abdominal pain and vomiting.   Musculoskeletal: Positive for arthralgias. Negative for neck pain.   Neurological: Negative for weakness and numbness.         Objective:      Physical Exam    Assessment:       Problem List Items Addressed This Visit        Cardiac/Vascular    Hyperlipidemia - Primary       Endocrine    BMI 36.0-36.9,adult      Other Visit Diagnoses     Constipation, unspecified constipation type              Plan:       ***

## 2022-05-17 NOTE — PROGRESS NOTES
"[facility] Note    Chief Complaint      Chief Complaint   Patient presents with    Follow-up     1 week lab follow up       History of Present Illness      Bob Cobian is a 56 y.o. male who presents today for Follow-up (1 week lab follow up)    Constipation chronic in nature 10-15 year and requires pulling stool out manually. No recent changes in bowel movements and consistency. Associated with constipation is back pain, hard stools that result in bright red blood on wiping which he attributes to hemorrhoids.   Bleeding occurs 1- 2 times a month and generally resolve with use of OTC preparation H.     Past Medical History:  History reviewed. No pertinent past medical history.    Past Surgical History:   has a past surgical history that includes Hernia repair; Appendectomy; and Colonoscopy (N/A, 9/30/2016).    Family History:  family history includes Cancer in his mother; Deep vein thrombosis in his brother and sister; Diabetes in his father; Hypertension in his father.     Social History:  Social History     Tobacco Use    Smoking status: Former Smoker     Types: Cigarettes     Start date: 1988    Smokeless tobacco: Current User     Types: Snuff    Tobacco comment: social   Substance Use Topics    Alcohol use: Yes     Alcohol/week: 0.0 standard drinks     Comment: occasional    Drug use: No       I personally reviewed all past medical, surgical, social and family history.    Review of Systems   Constitutional: Negative for chills, fever, malaise/fatigue and weight loss.   HENT: Negative for congestion, ear discharge, ear pain, sinus pain and sore throat.    Eyes: Negative for blurred vision, pain, discharge and redness.   Respiratory: Negative for cough, hemoptysis, sputum production, shortness of breath, wheezing and stridor.    Cardiovascular: Negative for chest pain, palpitations and leg swelling.   Gastrointestinal: Positive for blood in stool (intermittent from "hemorrhoids") and " constipation. Negative for abdominal pain, diarrhea, nausea and vomiting.   Genitourinary: Negative for dysuria, frequency and hematuria.   Musculoskeletal: Positive for joint pain (low back, bilateral knees). Negative for back pain, falls, myalgias and neck pain.   Skin: Negative.  Negative for rash.   Neurological: Negative for dizziness, tingling, focal weakness, seizures, weakness and headaches.   Psychiatric/Behavioral: Negative for depression and suicidal ideas. The patient is not nervous/anxious.       Medications:  Outpatient Encounter Medications as of 5/17/2022   Medication Sig Dispense Refill    acetaminophen (TYLENOL) 650 MG TbSR Take 1 tablet (650 mg total) by mouth every 8 (eight) hours. for 14 days 42 tablet 0    diclofenac (VOLTAREN) 75 MG EC tablet Take 1 tablet (75 mg total) by mouth 3 (three) times daily. 21 tablet 0    diclofenac sodium (SOLARAZE) 3 % gel Apply over affected area in lower back. 100 g 1    psyllium 0.52 gram capsule Take 3 capsules (1.56 g total) by mouth 2 (two) times a day. 540 capsule 0    senna-docusate 8.6-50 mg (SENOKOT-S) 8.6-50 mg per tablet Take 1 tablet by mouth once daily. 30 tablet 0    [DISCONTINUED] psyllium 0.52 gram capsule Take 1 capsule (0.52 g total) by mouth once daily. 90 capsule 1     No facility-administered encounter medications on file as of 5/17/2022.     Allergies:  Review of patient's allergies indicates:  No Known Allergies    Health Maintenance:  Immunization History   Administered Date(s) Administered    COVID-19, MRNA, LN-S, PF (MODERNA FULL 0.5 ML DOSE) 03/09/2021, 04/06/2021    COVID-19, vector-nr, rS-Ad26, PF (Yash) 05/27/2021    Influenza - Quadrivalent - PF *Preferred* (6 months and older) 01/26/2016, 12/19/2017, 09/24/2020    Td - PF (ADULT) 12/19/2017      Health Maintenance   Topic Date Due    Lipid Panel  05/10/2027    TETANUS VACCINE  12/19/2027    Hepatitis C Screening  Completed        Physical Exam      Vital  "Signs  Temp: 98.5 °F (36.9 °C)  Temp src: Oral  Pulse: 72  SpO2: (!) 94 %  BP: 136/85  BP Location: Left arm  Patient Position: Sitting  Pain Score:   4  Pain Loc: Back  Height and Weight  Height: 5' 6" (167.6 cm)  Weight: 100.6 kg (221 lb 12.5 oz)  BSA (Calculated - sq m): 2.16 sq meters  BMI (Calculated): 35.8  Weight in (lb) to have BMI = 25: 154.6]    Physical Exam  Vitals and nursing note reviewed.   Constitutional:       General: He is not in acute distress.     Appearance: Normal appearance. He is not toxic-appearing.   HENT:      Head: Normocephalic and atraumatic.      Right Ear: External ear normal.      Left Ear: External ear normal.      Nose: Nose normal.      Mouth/Throat:      Mouth: Mucous membranes are moist.      Pharynx: Oropharynx is clear.   Eyes:      Extraocular Movements: Extraocular movements intact.      Conjunctiva/sclera: Conjunctivae normal.   Cardiovascular:      Rate and Rhythm: Normal rate and regular rhythm.      Pulses: Normal pulses.      Heart sounds: Normal heart sounds. No murmur heard.  Pulmonary:      Effort: Pulmonary effort is normal. No respiratory distress.      Breath sounds: Normal breath sounds. No wheezing or rales.   Abdominal:      General: Abdomen is flat. Bowel sounds are normal. There is no distension.      Palpations: Abdomen is soft. There is no mass.      Tenderness: There is no abdominal tenderness. There is no right CVA tenderness, left CVA tenderness or guarding.   Musculoskeletal:         General: Normal range of motion.      Cervical back: Normal range of motion and neck supple. No rigidity.      Right lower leg: No edema.      Left lower leg: No edema.   Skin:     General: Skin is warm and dry.      Capillary Refill: Capillary refill takes less than 2 seconds.   Neurological:      General: No focal deficit present.      Mental Status: He is alert and oriented to person, place, and time.      Motor: No weakness.      Gait: Gait normal.   Psychiatric:       " "  Mood and Affect: Mood normal.         Behavior: Behavior normal.       Laboratory:  CBC:  Recent Labs   Lab 05/10/22  1458   WBC 6.79   RBC 4.53 L   Hemoglobin 14.1   Hematocrit 43.0   Platelets 218   MCV 95   MCH 31.1 H   MCHC 32.8     CMP:  Recent Labs   Lab 05/10/22  1458   Glucose 100   Calcium 9.1   Albumin 3.4 L   Total Protein 7.1   Sodium 143   Potassium 4.1   CO2 30 H   Chloride 110   BUN 13   Alkaline Phosphatase 54 L    H   AST 96 H   Total Bilirubin 0.7          LIPIDS:  Recent Labs   Lab 05/10/22  1458   HDL 39 L   Cholesterol 136   Triglycerides 166 H   LDL Cholesterol 63.8   HDL/Cholesterol Ratio 28.7   Non-HDL Cholesterol 97   Total Cholesterol/HDL Ratio 3.5     Assessment/Plan     Bob Cobian is a 56 y.o.male with:    Problem List Items Addressed This Visit        Cardiac/Vascular    Hyperlipidemia - Primary    Current Assessment & Plan     - Your LDL "bad cholesterol" is 63.8  - Your HDL "good cholesterol" is LOW  39  - Your Triglycerides, natural fats and oils in blood is elevated at 166    - To help improve your cardiovascular health and reduce your risk of stroke or heart attack, the following healthy lifestyle changes are recommended.   - Choose whole food items, fresh/frozen fruits and vegetables and unprocessed meats.  - Avoid processed food items, cold cuts, canned foods in sugary and high sodium preservatives.   - Reduce intake of highly refined carbohydrates or white starchy foods, white bread, white flour pasta, sugary cereals, sugary drinks, sweet tea, soda including diet, candies, cakes and donuts.    - Reduce or avoid saturated fats (fats from animals and processed oils like palm oil, peanut oil, vegetable oil) and fried food items.   - Increase healthy fats like omega-3 fish oil, fatty fish (salmon, mackerel, sardine etc), olive oil, avocado, raw nuts etc.   - Increase fiber intake with daily goal of 6-8 servings of vegetables.  - Remember to maintain daily adequate " hydration with water 1.5 to 2 liters fluid volume.    - Daily physical activities, start slow with 10-15 minutes of walk daily, then increase as tolerated to twice daily.   - Cardiovascular exercise also improves your cholesterol levels and your goal is low intensity (like walking and biking) 150 min/week to moderate/high intensity 75 min/week.             Relevant Medications    psyllium 0.52 gram capsule       Endocrine    BMI 36.0-36.9,adult    Current Assessment & Plan     Wt Readings from Last 3 Encounters:   05/17/22 0928 100.6 kg (221 lb 12.5 oz)   05/10/22 1400 101.4 kg (223 lb 7 oz)   05/05/22 0838 99.8 kg (220 lb)   Recommendations:   Stay physically active. As tolerated alternate resistance training with stretching and cardio. Goal of 150 minutes per week of moderate intensity activity or 7,500 - 10,000 steps per day. Follow the Mediterranean Diet. Include whole fresh fruits, vegetables, olive oil, seeds, nuts, whole grains, cold water fish, salmon, mackerel and lean cuts of meat.  Do not drink sugary/diet carbonated beverages. Decrease portion sizes slightly which will result in an approximately 500-calorie deficit. Avoid fast or fried and processed food, especially canned foods. Avoid refined carbohydrates, white starchy foods, flour, white potato, bread, muffins, and cakes. Consider substituting one meal a day with a meal replacement such as Slim fast, lean cuisine, or weight watcher's. Follow a healthy diet that includes enough calcium, vitamin D and proteins for bone health.                GI    Constipation    Current Assessment & Plan     Daily hydration status is currently inadequate. He has reduced his sugary coke intake from 3 cans to 1-2 cans daily. Drinks only 1-2 bottles of 6 ounce bottles.   - To maintain adequate hydration, increase daily intake to 1.5-2L volume  - increase fiber in diet  - start daily metamucil and stool softener  - f/u 4 weeks or sooner with no improvement            Relevant Medications    senna-docusate 8.6-50 mg (SENOKOT-S) 8.6-50 mg per tablet    psyllium 0.52 gram capsule    Elevated LFTs    Current Assessment & Plan     Patient states he was on a supplement and concerning liver function, he had stopped use several months back. Denies excessive alcoholic beverage drinking. Physical exam negative for distention, liver enlargement, tenderness. Will f/u hepatitis check and RUQ ultrasound.            Relevant Orders    US Abdomen Limited          Other than changes above, cont current medications and maintain follow up with specialists.  Follow up in about 4 weeks (around 6/14/2022).    Future Appointments   Date Time Provider Department Center   6/17/2022  8:45 AM Emeka Prince DO [facility]       Kazumi G Yoshinaga, DO [facility]

## 2022-05-18 PROBLEM — R79.89 ELEVATED LFTS: Status: ACTIVE | Noted: 2022-05-18

## 2022-05-18 LAB — HCV AB SERPL QL IA: NEGATIVE

## 2022-05-18 RX ORDER — BROMPHENIRAMINE MALEATE, DEXTROMETHORPHAN HBR, PHENYLEPHRINE HCL, DIPHENHYDRAMINE HCL, PHENYLEPHRINE HCL 0.52G
1.5 KIT ORAL 2 TIMES DAILY
Qty: 540 CAPSULE | Refills: 0 | Status: SHIPPED | OUTPATIENT
Start: 2022-05-18 | End: 2022-08-16

## 2022-05-18 NOTE — ASSESSMENT & PLAN NOTE
Patient states he was on a supplement and concerning liver function, he had stopped use several months back. Denies excessive alcoholic beverage drinking. Physical exam negative for distention, liver enlargement, tenderness. Will f/u hepatitis check and RUQ ultrasound.

## 2022-05-18 NOTE — ASSESSMENT & PLAN NOTE
Wt Readings from Last 3 Encounters:   05/17/22 0928 100.6 kg (221 lb 12.5 oz)   05/10/22 1400 101.4 kg (223 lb 7 oz)   05/05/22 0838 99.8 kg (220 lb)   Recommendations:   Stay physically active. As tolerated alternate resistance training with stretching and cardio. Goal of 150 minutes per week of moderate intensity activity or 7,500 - 10,000 steps per day. Follow the Mediterranean Diet. Include whole fresh fruits, vegetables, olive oil, seeds, nuts, whole grains, cold water fish, salmon, mackerel and lean cuts of meat.  Do not drink sugary/diet carbonated beverages. Decrease portion sizes slightly which will result in an approximately 500-calorie deficit. Avoid fast or fried and processed food, especially canned foods. Avoid refined carbohydrates, white starchy foods, flour, white potato, bread, muffins, and cakes. Consider substituting one meal a day with a meal replacement such as Slim fast, lean cuisine, or weight watcher's. Follow a healthy diet that includes enough calcium, vitamin D and proteins for bone health.

## 2022-05-18 NOTE — ASSESSMENT & PLAN NOTE
Daily hydration status is currently inadequate. He has reduced his sugary coke intake from 3 cans to 1-2 cans daily. Drinks only 1-2 bottles of 6 ounce bottles.   - To maintain adequate hydration, increase daily intake to 1.5-2L volume  - increase fiber in diet  - start daily metamucil and stool softener  - f/u 4 weeks or sooner with no improvement

## 2022-05-26 ENCOUNTER — HOSPITAL ENCOUNTER (OUTPATIENT)
Dept: RADIOLOGY | Facility: HOSPITAL | Age: 57
Discharge: HOME | End: 2022-05-26
Attending: STUDENT IN AN ORGANIZED HEALTH CARE EDUCATION/TRAINING PROGRAM
Payer: MEDICAID

## 2022-05-26 DIAGNOSIS — R79.89 ELEVATED LFTS: ICD-10-CM

## 2022-05-26 PROCEDURE — 76705 ECHO EXAM OF ABDOMEN: CPT | Mod: TC

## 2022-08-11 ENCOUNTER — APPOINTMENT (OUTPATIENT)
Dept: PRIMARY CARE CLINIC | Facility: CLINIC | Age: 57
End: 2022-08-11
Payer: MEDICAID

## 2022-08-11 DIAGNOSIS — Z11.52 ENCOUNTER FOR SCREENING FOR COVID-19: Primary | ICD-10-CM

## 2022-08-11 LAB
CTP QC/QA: YES
SARS-COV-2 AG RESP QL IA.RAPID: POSITIVE

## 2022-08-11 PROCEDURE — 87426 SARSCOV CORONAVIRUS AG IA: CPT | Mod: PBBFAC,PN

## 2022-08-12 ENCOUNTER — OFFICE VISIT (OUTPATIENT)
Dept: PRIMARY CARE CLINIC | Facility: CLINIC | Age: 57
End: 2022-08-12
Payer: MEDICAID

## 2022-08-12 VITALS — WEIGHT: 220 LBS | HEIGHT: 66 IN | TEMPERATURE: 98 F | BODY MASS INDEX: 35.36 KG/M2

## 2022-08-12 DIAGNOSIS — U07.1 COVID-19: Primary | ICD-10-CM

## 2022-08-12 PROCEDURE — 99213 PR OFFICE/OUTPT VISIT, EST, LEVL III, 20-29 MIN: ICD-10-PCS | Mod: 95,,, | Performed by: STUDENT IN AN ORGANIZED HEALTH CARE EDUCATION/TRAINING PROGRAM

## 2022-08-12 PROCEDURE — 3008F PR BODY MASS INDEX (BMI) DOCUMENTED: ICD-10-PCS | Mod: CPTII,95,, | Performed by: STUDENT IN AN ORGANIZED HEALTH CARE EDUCATION/TRAINING PROGRAM

## 2022-08-12 PROCEDURE — 1159F PR MEDICATION LIST DOCUMENTED IN MEDICAL RECORD: ICD-10-PCS | Mod: CPTII,95,, | Performed by: STUDENT IN AN ORGANIZED HEALTH CARE EDUCATION/TRAINING PROGRAM

## 2022-08-12 PROCEDURE — 1159F MED LIST DOCD IN RCRD: CPT | Mod: CPTII,95,, | Performed by: STUDENT IN AN ORGANIZED HEALTH CARE EDUCATION/TRAINING PROGRAM

## 2022-08-12 PROCEDURE — 99213 OFFICE O/P EST LOW 20 MIN: CPT | Mod: 95,,, | Performed by: STUDENT IN AN ORGANIZED HEALTH CARE EDUCATION/TRAINING PROGRAM

## 2022-08-12 PROCEDURE — 3008F BODY MASS INDEX DOCD: CPT | Mod: CPTII,95,, | Performed by: STUDENT IN AN ORGANIZED HEALTH CARE EDUCATION/TRAINING PROGRAM

## 2022-08-12 PROCEDURE — 1160F RVW MEDS BY RX/DR IN RCRD: CPT | Mod: CPTII,95,, | Performed by: STUDENT IN AN ORGANIZED HEALTH CARE EDUCATION/TRAINING PROGRAM

## 2022-08-12 PROCEDURE — 1160F PR REVIEW ALL MEDS BY PRESCRIBER/CLIN PHARMACIST DOCUMENTED: ICD-10-PCS | Mod: CPTII,95,, | Performed by: STUDENT IN AN ORGANIZED HEALTH CARE EDUCATION/TRAINING PROGRAM

## 2022-08-12 RX ORDER — PROMETHAZINE HYDROCHLORIDE AND DEXTROMETHORPHAN HYDROBROMIDE 6.25; 15 MG/5ML; MG/5ML
5 SYRUP ORAL EVERY 8 HOURS
Qty: 210 ML | Refills: 0 | Status: SHIPPED | OUTPATIENT
Start: 2022-08-12 | End: 2022-08-26

## 2022-08-12 RX ORDER — FLUTICASONE PROPIONATE 50 MCG
2 SPRAY, SUSPENSION (ML) NASAL DAILY
Qty: 16 G | Refills: 0 | Status: SHIPPED | OUTPATIENT
Start: 2022-08-12 | End: 2022-08-26

## 2022-08-12 NOTE — PROGRESS NOTES
[facility] Note    Chief Complaint      Chief Complaint   Patient presents with    Sore Throat    Pressure Behind the Eyes     PATIENT STATES PRESSURE IN HIS EARS AS WELL    Chest Congestion       History of Present Illness      Bob Cobian is a 57 y.o. male who presents today for Sore Throat, Pressure Behind the Eyes (PATIENT STATES PRESSURE IN HIS EARS AS WELL), and Chest Congestion    COVID19 positive on 8/11/22. Symptoms started couple days ago with chill and fever, both have subsided, but now with a lot of congestion, runny nose, sneezing and coughing. Also has diarrhea that is non watery, but loose with no blood. PO intake has not been affected, eating and drinking without any problems.   Denies chest pain, palpitations, shortness of breath. Denies breathing difficulties at bedtime.     Past Medical History:  History reviewed. No pertinent past medical history.    Past Surgical History:   has a past surgical history that includes Hernia repair; Appendectomy; and Colonoscopy (N/A, 9/30/2016).    Family History:  family history includes Cancer in his mother; Deep vein thrombosis in his brother and sister; Diabetes in his father; Hypertension in his father.     Social History:  Social History     Tobacco Use    Smoking status: Former Smoker     Types: Cigarettes     Start date: 1988    Smokeless tobacco: Current User     Types: Snuff    Tobacco comment: social   Substance Use Topics    Alcohol use: Yes     Alcohol/week: 0.0 standard drinks     Comment: occasional    Drug use: No     I personally reviewed all past medical, surgical, social and family history.    Review of Systems   Constitutional: Negative for chills, fever and malaise/fatigue.   HENT: Positive for congestion and sore throat. Negative for ear pain, hearing loss and tinnitus.    Eyes: Negative for blurred vision, double vision, photophobia and pain.   Respiratory: Positive for cough. Negative for hemoptysis,  shortness of breath and wheezing.    Cardiovascular: Negative for chest pain, palpitations and orthopnea.   Gastrointestinal: Positive for diarrhea. Negative for abdominal pain, blood in stool, constipation, heartburn, melena, nausea and vomiting.   Genitourinary: Negative for dysuria, flank pain, frequency and hematuria.   Musculoskeletal: Negative for myalgias and neck pain.   Neurological: Positive for headaches (mild intermittent). Negative for dizziness, tremors, focal weakness, seizures, loss of consciousness and weakness.      Medications:  Outpatient Encounter Medications as of 8/12/2022   Medication Sig Dispense Refill    diclofenac (VOLTAREN) 75 MG EC tablet Take 1 tablet (75 mg total) by mouth 3 (three) times daily. 21 tablet 0    diclofenac sodium (SOLARAZE) 3 % gel Apply over affected area in lower back. 100 g 1    fluticasone propionate (FLONASE) 50 mcg/actuation nasal spray 2 sprays (100 mcg total) by Each Nostril route once daily. for 14 days 16 g 0    promethazine-dextromethorphan (PROMETHAZINE-DM) 6.25-15 mg/5 mL Syrp Take 5 mLs by mouth every 8 (eight) hours. for 14 days 210 mL 0    psyllium 0.52 gram capsule Take 3 capsules (1.56 g total) by mouth 2 (two) times a day. 540 capsule 0     No facility-administered encounter medications on file as of 8/12/2022.     Allergies:  Review of patient's allergies indicates:  No Known Allergies    Health Maintenance:  Immunization History   Administered Date(s) Administered    COVID-19, MRNA, LN-S, PF (MODERNA FULL 0.5 ML DOSE) 03/09/2021, 04/06/2021    COVID-19, vector-nr, rS-Ad26, PF (Yash) 05/27/2021    Influenza - Quadrivalent - PF *Preferred* (6 months and older) 01/26/2016, 12/19/2017, 09/24/2020    Td - PF (ADULT) 12/19/2017      Health Maintenance   Topic Date Due    Lipid Panel  05/10/2027    TETANUS VACCINE  12/19/2027    Hepatitis C Screening  Completed        Physical Exam      Vital Signs  Temp: 98.4 °F (36.9 °C)  Temp src:  "Oral  Pain Score: 0-No pain  Height and Weight  Height: 5' 6" (167.6 cm)  Weight: 99.8 kg (220 lb)  BSA (Calculated - sq m): 2.16 sq meters  BMI (Calculated): 35.5  Weight in (lb) to have BMI = 25: 154.6]    Physical Exam  Constitutional:       General: He is not in acute distress.     Appearance: Normal appearance. He is not ill-appearing or toxic-appearing.   HENT:      Head: Normocephalic.   Eyes:      Extraocular Movements: Extraocular movements intact.   Pulmonary:      Effort: No respiratory distress.      Comments: Speaking in full sentences without interruption. Minimal cough  Musculoskeletal:      Cervical back: No rigidity.   Neurological:      Mental Status: He is alert.   Psychiatric:         Mood and Affect: Mood normal.         Behavior: Behavior normal.         Thought Content: Thought content normal.         Judgment: Judgment normal.     Laboratory:  CBC:  Recent Labs   Lab 05/10/22  1458   WBC 6.79   RBC 4.53 L   Hemoglobin 14.1   Hematocrit 43.0   Platelets 218   MCV 95   MCH 31.1 H   MCHC 32.8     CMP:  Recent Labs   Lab 05/10/22  1458   Glucose 100   Calcium 9.1   Albumin 3.4 L   Total Protein 7.1   Sodium 143   Potassium 4.1   CO2 30 H   Chloride 110   BUN 13   Alkaline Phosphatase 54 L    H   AST 96 H   Total Bilirubin 0.7     URINALYSIS:       LIPIDS:  Recent Labs   Lab 05/10/22  1458   HDL 39 L   Cholesterol 136   Triglycerides 166 H   LDL Cholesterol 63.8   HDL/Cholesterol Ratio 28.7   Non-HDL Cholesterol 97   Total Cholesterol/HDL Ratio 3.5           Assessment/Plan     Bob Cobian is a 57 y.o.male with:    Problem List Items Addressed This Visit        ID    COVID-19 - Primary    Current Assessment & Plan     1. Rest  2. Drink lots of fluids, water and soups. Avoid sugary drinks and juices, as they may cause upset stomach.   3. Vitamin C 1000 mg daily and Zinc 50 mg daily.  4. Over the counter medications like;   - Cough syrup Daytime and Nighttime.  - Vicks vapor on your " chest.  5. Ibuprofen, Motrin, or Aleve for muscle aches, fever and sore throat.  - Avoid if you have kidney or heart disease, history of a stroke or hypertension.  6. Tylenol Extra Strength can also be used for muscle aches, fever and sore throat.  - Avoid if you have severe liver issue.  7. Notify me or office, if fever >3 days, fever that resolves and then returns, shortness of breath, chest pain, severe headache, severe abdominal pain, excessive nausea/vomiting or any other concerns.           Relevant Medications    fluticasone propionate (FLONASE) 50 mcg/actuation nasal spray    promethazine-dextromethorphan (PROMETHAZINE-DM) 6.25-15 mg/5 mL Syrp          Other than changes above, cont current medications and maintain follow up with specialists.  Follow up in about 1 week (around 8/19/2022), or if symptoms worsen or fail to improve.    No future appointments.    Kazumi G Yoshinaga, DO [facility]

## 2022-08-12 NOTE — ASSESSMENT & PLAN NOTE
1. Rest  2. Drink lots of fluids, water and soups. Avoid sugary drinks and juices, as they may cause upset stomach.   3. Vitamin C 1000 mg daily and Zinc 50 mg daily.  4. Over the counter medications like;   - Cough syrup Daytime and Nighttime.  - Vicks vapor on your chest.  5. Ibuprofen, Motrin, or Aleve for muscle aches, fever and sore throat.  - Avoid if you have kidney or heart disease, history of a stroke or hypertension.  6. Tylenol Extra Strength can also be used for muscle aches, fever and sore throat.  - Avoid if you have severe liver issue.  7. Notify me or office, if fever >3 days, fever that resolves and then returns, shortness of breath, chest pain, severe headache, severe abdominal pain, excessive nausea/vomiting or any other concerns.

## 2022-09-09 DIAGNOSIS — R09.81 CHRONIC NASAL CONGESTION: Primary | ICD-10-CM

## 2022-09-09 RX ORDER — FLUTICASONE PROPIONATE 50 MCG
1 SPRAY, SUSPENSION (ML) NASAL 2 TIMES DAILY PRN
Qty: 16 G | Refills: 0 | Status: SHIPPED | OUTPATIENT
Start: 2022-09-09 | End: 2023-08-10

## 2022-09-09 RX ORDER — FLUTICASONE PROPIONATE 50 MCG
1 SPRAY, SUSPENSION (ML) NASAL 2 TIMES DAILY PRN
Qty: 15 G | Refills: 0 | Status: SHIPPED | OUTPATIENT
Start: 2022-09-09 | End: 2022-09-09 | Stop reason: SDUPTHER

## 2023-06-08 ENCOUNTER — HOSPITAL ENCOUNTER (EMERGENCY)
Facility: HOSPITAL | Age: 58
Discharge: HOME | End: 2023-06-08
Attending: EMERGENCY MEDICINE
Payer: MEDICAID

## 2023-06-08 VITALS
SYSTOLIC BLOOD PRESSURE: 136 MMHG | BODY MASS INDEX: 35.02 KG/M2 | DIASTOLIC BLOOD PRESSURE: 89 MMHG | WEIGHT: 217 LBS | TEMPERATURE: 98 F | RESPIRATION RATE: 18 BRPM | OXYGEN SATURATION: 100 % | HEART RATE: 73 BPM

## 2023-06-08 DIAGNOSIS — G89.29 CHRONIC PAIN OF RIGHT KNEE: Primary | ICD-10-CM

## 2023-06-08 DIAGNOSIS — M25.561 CHRONIC PAIN OF RIGHT KNEE: Primary | ICD-10-CM

## 2023-06-08 PROCEDURE — 99283 EMERGENCY DEPT VISIT LOW MDM: CPT

## 2023-06-08 RX ORDER — HYDROCODONE BITARTRATE AND ACETAMINOPHEN 5; 325 MG/1; MG/1
1 TABLET ORAL EVERY 8 HOURS PRN
Qty: 11 TABLET | Refills: 0 | Status: SHIPPED | OUTPATIENT
Start: 2023-06-08 | End: 2023-06-08 | Stop reason: DRUGHIGH

## 2023-06-08 RX ORDER — HYDROCODONE BITARTRATE AND ACETAMINOPHEN 10; 325 MG/1; MG/1
1 TABLET ORAL EVERY 8 HOURS PRN
Qty: 11 TABLET | Refills: 0 | Status: SHIPPED | OUTPATIENT
Start: 2023-06-08 | End: 2023-08-10

## 2023-06-08 NOTE — ED PROVIDER NOTES
Encounter Date: 6/8/2023       History     Chief Complaint   Patient presents with    Knee Pain     Right knee pain x1 week after twisting it getting out of truck      58-year-old male states he twisted his right knee 1 week ago getting out of his truck.  States his knee hurts on the inside part of the knee.  Has been using a knee wrap that seems to help.  Not taking any medications.  No other injuries.  Alert oriented x4, GCS is 15.  Pain is worse with ambulation, better with rest.    Review of patient's allergies indicates:  No Known Allergies  No past medical history on file.  Past Surgical History:   Procedure Laterality Date    APPENDECTOMY      COLONOSCOPY N/A 9/30/2016    Procedure: COLONOSCOPY;  Surgeon: Luis Bogran-Reyes, MD;  Location: [facility];  Service: Endoscopy;  Laterality: N/A;    HERNIA REPAIR       Family History   Problem Relation Age of Onset    Cancer Mother     Diabetes Father     Hypertension Father     Deep vein thrombosis Sister     Deep vein thrombosis Brother      Social History     Tobacco Use    Smoking status: Former     Types: Cigarettes     Start date: 1988    Smokeless tobacco: Current     Types: Snuff    Tobacco comments:     social   Substance Use Topics    Alcohol use: Yes     Alcohol/week: 0.0 standard drinks     Comment: occasional    Drug use: No     Review of Systems   Constitutional:  Negative for fever.   HENT:  Negative for sore throat.    Respiratory:  Negative for shortness of breath.    Cardiovascular:  Negative for chest pain.   Gastrointestinal:  Negative for nausea.   Genitourinary:  Negative for dysuria.   Musculoskeletal:  Positive for arthralgias. Negative for back pain.   Skin:  Negative for rash.   Neurological:  Negative for weakness.   Hematological:  Does not bruise/bleed easily.   All other systems reviewed and are negative.    Physical Exam     Initial Vitals [06/08/23 1424]   BP Pulse Resp Temp SpO2   136/89 73 18 98 °F (36.7 °C) 100 %      MAP       --          Physical Exam    Nursing note and vitals reviewed.  Constitutional: He appears well-developed and well-nourished. He is not diaphoretic. No distress.   HENT:   Head: Normocephalic and atraumatic.   Eyes: Conjunctivae and EOM are normal. Pupils are equal, round, and reactive to light. Right eye exhibits no discharge. Left eye exhibits no discharge. No scleral icterus.   Neck: Neck supple. No JVD present.   Normal range of motion.  Cardiovascular:  Normal rate, regular rhythm, normal heart sounds and intact distal pulses.           No murmur heard.  Pulmonary/Chest: Breath sounds normal. No stridor. No respiratory distress. He has no wheezes. He has no rhonchi. He has no rales. He exhibits no tenderness.   Abdominal: Abdomen is soft. Bowel sounds are normal. He exhibits no distension and no mass. There is no abdominal tenderness. There is no rebound and no guarding.   Musculoskeletal:         General: Tenderness present. No edema. Normal range of motion.      Cervical back: Normal range of motion and neck supple.      Comments: Mild tenderness noted to medial aspect of right knee.  No laxity of joint.  Neurovascularly intact throughout     Neurological: He is alert and oriented to person, place, and time. He has normal strength. GCS score is 15. GCS eye subscore is 4. GCS verbal subscore is 5. GCS motor subscore is 6.   Skin: Skin is warm and dry. Capillary refill takes less than 2 seconds.       ED Course   Procedures  Labs Reviewed - No data to display       Imaging Results              X-Ray Knee 1 or 2 View Right (In process)                      Medications - No data to display  Medical Decision Making:   Differential Diagnosis:   Right knee pain, sprain, ligament injury           ED Course as of 06/08/23 1458   Thu Jun 08, 2023   1456 X-ray with no acute changes.  Neurovascularly intact.  Stable for discharge and follow up with Orthopedics [SD]      ED Course User Index  [SD] Artie Puga MD                  Clinical Impression:   Final diagnoses:  [M25.561, G89.29] Chronic pain of right knee (Primary)        ED Disposition Condition    Discharge Stable          ED Prescriptions       Medication Sig Dispense Start Date End Date Auth. Provider    HYDROcodone-acetaminophen (NORCO) 5-325 mg per tablet Take 1 tablet by mouth every 8 (eight) hours as needed for Pain. 11 tablet 6/8/2023 -- Artie Puga MD          Follow-up Information       Follow up With Specialties Details Why Contact Info Additional Information    [facility] Orth[facility] In 2 days  [address] [place] [address], [address] [place] [id number] [phone] Suite [facility]             rAtie Puga MD  06/08/23 8832

## 2023-06-14 ENCOUNTER — OFFICE VISIT (OUTPATIENT)
Dept: ORTHOPEDICS | Facility: CLINIC | Age: 58
End: 2023-06-14
Payer: MEDICAID

## 2023-06-14 DIAGNOSIS — M25.561 ACUTE PAIN OF RIGHT KNEE: ICD-10-CM

## 2023-06-14 DIAGNOSIS — M25.561 CHRONIC PAIN OF RIGHT KNEE: ICD-10-CM

## 2023-06-14 DIAGNOSIS — M17.11 PRIMARY OSTEOARTHRITIS OF RIGHT KNEE: Primary | ICD-10-CM

## 2023-06-14 DIAGNOSIS — G89.29 CHRONIC PAIN OF RIGHT KNEE: ICD-10-CM

## 2023-06-14 PROCEDURE — 99203 OFFICE O/P NEW LOW 30 MIN: CPT | Mod: S$PBB,25,, | Performed by: NURSE PRACTITIONER

## 2023-06-14 PROCEDURE — 1160F PR REVIEW ALL MEDS BY PRESCRIBER/CLIN PHARMACIST DOCUMENTED: ICD-10-PCS | Mod: CPTII,,, | Performed by: NURSE PRACTITIONER

## 2023-06-14 PROCEDURE — 20610 DRAIN/INJ JOINT/BURSA W/O US: CPT | Mod: PBBFAC | Performed by: NURSE PRACTITIONER

## 2023-06-14 PROCEDURE — 99999 PR PBB SHADOW E&M-EST. PATIENT-LVL III: ICD-10-PCS | Mod: PBBFAC,,, | Performed by: NURSE PRACTITIONER

## 2023-06-14 PROCEDURE — 1159F PR MEDICATION LIST DOCUMENTED IN MEDICAL RECORD: ICD-10-PCS | Mod: CPTII,,, | Performed by: NURSE PRACTITIONER

## 2023-06-14 PROCEDURE — 99203 PR OFFICE/OUTPT VISIT, NEW, LEVL III, 30-44 MIN: ICD-10-PCS | Mod: S$PBB,25,, | Performed by: NURSE PRACTITIONER

## 2023-06-14 PROCEDURE — 20610 DRAIN/INJ JOINT/BURSA W/O US: CPT | Mod: S$PBB,RT,, | Performed by: NURSE PRACTITIONER

## 2023-06-14 PROCEDURE — 1160F RVW MEDS BY RX/DR IN RCRD: CPT | Mod: CPTII,,, | Performed by: NURSE PRACTITIONER

## 2023-06-14 PROCEDURE — 97110 PR THERAPEUTIC EXERCISES: ICD-10-PCS | Mod: GP,,, | Performed by: NURSE PRACTITIONER

## 2023-06-14 PROCEDURE — 1159F MED LIST DOCD IN RCRD: CPT | Mod: CPTII,,, | Performed by: NURSE PRACTITIONER

## 2023-06-14 PROCEDURE — 99213 OFFICE O/P EST LOW 20 MIN: CPT | Mod: PBBFAC | Performed by: NURSE PRACTITIONER

## 2023-06-14 PROCEDURE — 20610 LARGE JOINT ASPIRATION/INJECTION: R KNEE: ICD-10-PCS | Mod: S$PBB,RT,, | Performed by: NURSE PRACTITIONER

## 2023-06-14 PROCEDURE — 97110 THERAPEUTIC EXERCISES: CPT | Mod: GP,,, | Performed by: NURSE PRACTITIONER

## 2023-06-14 PROCEDURE — 99999 PR PBB SHADOW E&M-EST. PATIENT-LVL III: CPT | Mod: PBBFAC,,, | Performed by: NURSE PRACTITIONER

## 2023-06-14 RX ORDER — BETAMETHASONE SODIUM PHOSPHATE AND BETAMETHASONE ACETATE 3; 3 MG/ML; MG/ML
6 INJECTION, SUSPENSION INTRA-ARTICULAR; INTRALESIONAL; INTRAMUSCULAR; SOFT TISSUE
Status: DISCONTINUED | OUTPATIENT
Start: 2023-06-14 | End: 2023-06-14 | Stop reason: HOSPADM

## 2023-06-14 RX ADMIN — BETAMETHASONE SODIUM PHOSPHATE AND BETAMETHASONE ACETATE 6 MG: 3; 3 INJECTION, SUSPENSION INTRA-ARTICULAR; INTRALESIONAL; INTRAMUSCULAR; SOFT TISSUE at 03:06

## 2023-06-14 NOTE — PROCEDURES
Large Joint Aspiration/Injection: R knee    Date/Time: 6/14/2023 3:15 PM  Performed by: Norman Reeves NP  Authorized by: Norman Reeves NP     Consent Done?:  Yes (Written)  Indications:  Arthritis  Site marked: the procedure site was marked    Timeout: prior to procedure the correct patient, procedure, and site was verified    Prep: patient was prepped and draped in usual sterile fashion      Local anesthesia used?: Yes    Local anesthetic:  Bupivacaine 0.5% without epinephrine and lidocaine 1% without epinephrine  Anesthetic total (ml):  2      Details:  Needle Size:  22 G  Approach:  Lateral  Location:  Knee  Site:  R knee  Medications:  6 mg betamethasone acetate-betamethasone sodium phosphate 6 mg/mL  Aspirate amount (mL):  3  Aspirate:  Clear  Patient tolerance:  Patient tolerated the procedure well with no immediate complications     Risks reviewed: pain, swelling, infection and nerve or tendon injury.     INEZ Jane MA in room

## 2023-06-14 NOTE — PROGRESS NOTES
Subjective:      Bob Cobian is a 58 y.o. male is a new patient who presents for right knee pain evaluation. He is referred by [facility] . He reports having an injury 2-3 weeks ago. He states that he stepping out of a lifted truck and twisted the right knee. He had immediate pain and painful gait. He reported to the ER on 06/8/23 and had radiographs done showing mild DJD changes with no acute fractures. He was placed on a Nsaid, pain meds and referred to orthopedics. He presents and rates his pain as 8/10 with weight bearing and ROM. The pain's location is over the medial aspect. He describes the symptoms as constant, aching and sharp. Symptoms have improve minimal with rest, pain meds and OTC analgesics. Symptoms worsen with activity, stair climbing, kneeling, weight bearing and sitting for prolonged periods of time. The knee has given out or felt unstable at times The patient can bend and straighten the knee fully but has medial sided pain. Treatment to date has been without relief. He is noted with a mild limp.      Outside reports reviewed: ER notes, X-rays      Review of Systems   Constitutional: Negative.  Negative for fever.   Musculoskeletal:  Positive for joint pain.   Skin: Negative.    Neurological: Negative.    Psychiatric/Behavioral: Negative.     All other systems reviewed and are negative.      Objective:      NVI  General :   alert, appears stated age, and cooperative   Gait: Antalgic gait.    Right Lower Extremity  Hip Palpation:  no tenderness over the greater  trochanter   Hip ROM: 100% of normal    Knee Effusion:  0-1+   Ecchymosis:  none   Knee ROM:  0 to 115 degrees with mild subpatellar crepitance. Painful AROM.   Patella:  Patella does track normally.   Patellar apprehension test: negative  Patellar compression test: negative   Tenderness: medial joint line, MCL   Stability:  Lachman's test: negative  Posterior drawer: negative  Medial collateral ligament: guarded  Lateral  collateral ligament: negative     Mahi Test:  negative   Zheng's Test:  guarded with medial joint line tenderness   Sensation:   intact to light touch   Pulses: normal DP and PT pulses.    Contralateral knee was without deficit.   Brisk cap refill noted.         Imaging  Radiographs RT knee reviewed from 06/08/23  Impression:    No acute osseous abnormality detected.  Mild degenerative change noted.  A component of knee joint fluid may be present.    Assessment:      RT knee pain, mild DJD and mild effusion     Plan:      1. Start directed physician guided exercises for ROM and strengthening for the right knee- [employer] knee conditioning packet given with exercises reviewed. HEP 23092 - He was instructed and demonstrated exercises per [employer] knee rehab exercises for HEP which include Heel cord stretch, standing quad stretch, supine hamstring stretch, half squats, hamstring curls, calf raises, leg extensions, straight leg raises, hip abduction, hip adduction and  leg presses. The patient then demonstrated understanding of exercises and proper technique. This program was performed for 15 minutes. Will be done 3 x week.  2. Activity modification discussed and recommended OTC brace for joint compression and support advised.  3. RT knee intra-articular steroid injection done, see progress note. He had immediate post injection relief.   4. Ice and limit stairs, squatting and single leg twisting.   5. Pennsaid bid (sample) as directed bid.   6. Follow up: 6 weeks. Consider MRI if needed.    7. He had no further questions.      PROCEDURE: RT knee intra-articular injection  I have explained the risks, benefits, and alternatives of the procedure in detail.  The patient voices understanding and all questions have been answered.  The patient agrees to proceed as planned. So after I performed a sterile prep of the skin in the normal fashion, the right knee is aspirated from the suprior lateral approach utilizing an 22 gauge needle.  I injected with a combination of 3 cc 1% plain lidocaine, 0.5% marcaine and 6 mg of celestone.  The patient is cautioned and immediate relief of pain is secondary to the local anesthetic and will be temporary.  After the anesthetic wears off there may be a increase in pain that may last for a few hours or a few days and they should use ice to help alleviate this flair up of pain.

## 2023-07-26 ENCOUNTER — OFFICE VISIT (OUTPATIENT)
Dept: ORTHOPEDICS | Facility: CLINIC | Age: 58
End: 2023-07-26
Payer: MEDICAID

## 2023-07-26 DIAGNOSIS — G89.29 CHRONIC PAIN OF RIGHT KNEE: Primary | ICD-10-CM

## 2023-07-26 DIAGNOSIS — M25.561 CHRONIC PAIN OF RIGHT KNEE: Primary | ICD-10-CM

## 2023-07-26 DIAGNOSIS — M17.11 PRIMARY OSTEOARTHRITIS OF RIGHT KNEE: ICD-10-CM

## 2023-07-26 PROCEDURE — 99212 OFFICE O/P EST SF 10 MIN: CPT | Mod: PBBFAC | Performed by: NURSE PRACTITIONER

## 2023-07-26 PROCEDURE — 1159F PR MEDICATION LIST DOCUMENTED IN MEDICAL RECORD: ICD-10-PCS | Mod: CPTII,,, | Performed by: NURSE PRACTITIONER

## 2023-07-26 PROCEDURE — 99999 PR PBB SHADOW E&M-EST. PATIENT-LVL II: CPT | Mod: PBBFAC,,, | Performed by: NURSE PRACTITIONER

## 2023-07-26 PROCEDURE — 99213 OFFICE O/P EST LOW 20 MIN: CPT | Mod: S$PBB,,, | Performed by: NURSE PRACTITIONER

## 2023-07-26 PROCEDURE — 99999 PR PBB SHADOW E&M-EST. PATIENT-LVL II: ICD-10-PCS | Mod: PBBFAC,,, | Performed by: NURSE PRACTITIONER

## 2023-07-26 PROCEDURE — 1159F MED LIST DOCD IN RCRD: CPT | Mod: CPTII,,, | Performed by: NURSE PRACTITIONER

## 2023-07-26 PROCEDURE — 1160F PR REVIEW ALL MEDS BY PRESCRIBER/CLIN PHARMACIST DOCUMENTED: ICD-10-PCS | Mod: CPTII,,, | Performed by: NURSE PRACTITIONER

## 2023-07-26 PROCEDURE — 99213 PR OFFICE/OUTPT VISIT, EST, LEVL III, 20-29 MIN: ICD-10-PCS | Mod: S$PBB,,, | Performed by: NURSE PRACTITIONER

## 2023-07-26 PROCEDURE — 1160F RVW MEDS BY RX/DR IN RCRD: CPT | Mod: CPTII,,, | Performed by: NURSE PRACTITIONER

## 2023-07-26 NOTE — PROGRESS NOTES
Subjective:        Follow up: RT Knee    Bob Cobian is a 58 y.o. male presents for follow up for right knee pain. He presents and rates his pain as 6/10 with weight bearing and ROM. He states that he only had short term relief post steroid injection. He has done the physician guided exercises 3 x week for 6 weeks from the dates 06/14/23 to 07/26/23. The pain's location is over the medial aspect as previous. He describes the symptoms as constant, aching and sharp. Symptoms worsen with activity, stair climbing, kneeling, weight bearing and sitting for prolonged periods of time. The knee has given out or felt unstable at times The patient can bend and straighten the knee fully but has medial sided pain as previous. He is noted with a mild limp.         Review of Systems   Constitutional: Negative.  Negative for fever.   Musculoskeletal:  Positive for joint pain.   Skin: Negative.    Neurological: Negative.    Psychiatric/Behavioral: Negative.     All other systems reviewed and are negative.      Objective:      NVI  General :   alert, appears stated age, and cooperative   Gait: Antalgic gait.    Right Lower Extremity  Hip Palpation:  no tenderness over the greater  trochanter   Hip ROM: 100% of normal    Knee Effusion:  0-1+   Ecchymosis:  none   Knee ROM:  0 to 115 degrees with mild subpatellar crepitance. Painful AROM.   Patella:  Patella does track normally.   Patellar apprehension test: negative  Patellar compression test: negative   Tenderness: medial joint line   Stability:  Lachman's test: negative  Posterior drawer: negative  Medial collateral ligament: guarded  Lateral collateral ligament: negative     Mahi Test:  negative   Zheng's Test:  Present with medial joint line tenderness   Sensation:   intact to light touch   Pulses: normal DP and PT pulses.    Contralateral knee was without deficit.   Brisk cap refill noted.         Imaging  Radiographs RT knee reviewed from 06/08/23  Impression:    No  acute osseous abnormality detected.  Mild degenerative change noted.  A component of knee joint fluid may be present.     Assessment:      RT knee pain, mild DJD and concern for internal derangement     Plan:      1. MRI RT Knee to evaluate for internal derangement. He has completed 6 weeks conservative treatment from 06/14/23 to 07/26/23. He followed the physician guided exercises for ROM and strengthening for the right knee: regimen included Heel cord stretch, standing quad stretch, supine hamstring stretch, half squats, hamstring curls, calf raises, leg extensions, straight leg raises, hip abduction, hip adduction and  leg presses. The patient then demonstrated understanding of exercises and proper technique. Were done 3 x week x 6 weeks.  2. OTC brace for joint compression.  3. Ice and limit stairs, squatting and single leg twisting.   4. Nsaid as previous.  5. Follow up: post  MRI for review.   6. He had no further questions.

## 2023-08-02 ENCOUNTER — HOSPITAL ENCOUNTER (OUTPATIENT)
Dept: RADIOLOGY | Facility: HOSPITAL | Age: 58
Discharge: HOME | End: 2023-08-02
Attending: NURSE PRACTITIONER
Payer: MEDICAID

## 2023-08-02 ENCOUNTER — TELEPHONE (OUTPATIENT)
Dept: ORTHOPEDICS | Facility: CLINIC | Age: 58
End: 2023-08-02
Payer: MEDICAID

## 2023-08-02 DIAGNOSIS — G89.29 CHRONIC PAIN OF RIGHT KNEE: ICD-10-CM

## 2023-08-02 DIAGNOSIS — M25.561 CHRONIC PAIN OF RIGHT KNEE: ICD-10-CM

## 2023-08-02 DIAGNOSIS — M17.11 PRIMARY OSTEOARTHRITIS OF RIGHT KNEE: ICD-10-CM

## 2023-08-02 PROCEDURE — 73721 MRI JNT OF LWR EXTRE W/O DYE: CPT | Mod: TC,RT

## 2023-08-03 ENCOUNTER — OFFICE VISIT (OUTPATIENT)
Dept: ORTHOPEDICS | Facility: CLINIC | Age: 58
End: 2023-08-03
Payer: MEDICAID

## 2023-08-03 DIAGNOSIS — G89.29 CHRONIC PAIN OF RIGHT KNEE: ICD-10-CM

## 2023-08-03 DIAGNOSIS — M17.11 PRIMARY OSTEOARTHRITIS OF RIGHT KNEE: Primary | ICD-10-CM

## 2023-08-03 DIAGNOSIS — S83.231D COMPLEX TEAR OF MEDIAL MENISCUS OF RIGHT KNEE AS CURRENT INJURY, SUBSEQUENT ENCOUNTER: ICD-10-CM

## 2023-08-03 DIAGNOSIS — M25.561 CHRONIC PAIN OF RIGHT KNEE: ICD-10-CM

## 2023-08-03 PROCEDURE — 1160F PR REVIEW ALL MEDS BY PRESCRIBER/CLIN PHARMACIST DOCUMENTED: ICD-10-PCS | Mod: CPTII,,, | Performed by: NURSE PRACTITIONER

## 2023-08-03 PROCEDURE — 99213 OFFICE O/P EST LOW 20 MIN: CPT | Mod: S$PBB,,, | Performed by: NURSE PRACTITIONER

## 2023-08-03 PROCEDURE — 1160F RVW MEDS BY RX/DR IN RCRD: CPT | Mod: CPTII,,, | Performed by: NURSE PRACTITIONER

## 2023-08-03 PROCEDURE — 1159F PR MEDICATION LIST DOCUMENTED IN MEDICAL RECORD: ICD-10-PCS | Mod: CPTII,,, | Performed by: NURSE PRACTITIONER

## 2023-08-03 PROCEDURE — 99213 OFFICE O/P EST LOW 20 MIN: CPT | Mod: PBBFAC | Performed by: NURSE PRACTITIONER

## 2023-08-03 PROCEDURE — 1159F MED LIST DOCD IN RCRD: CPT | Mod: CPTII,,, | Performed by: NURSE PRACTITIONER

## 2023-08-03 PROCEDURE — 99999 PR PBB SHADOW E&M-EST. PATIENT-LVL III: ICD-10-PCS | Mod: PBBFAC,,, | Performed by: NURSE PRACTITIONER

## 2023-08-03 PROCEDURE — 99999 PR PBB SHADOW E&M-EST. PATIENT-LVL III: CPT | Mod: PBBFAC,,, | Performed by: NURSE PRACTITIONER

## 2023-08-03 PROCEDURE — 99213 PR OFFICE/OUTPT VISIT, EST, LEVL III, 20-29 MIN: ICD-10-PCS | Mod: S$PBB,,, | Performed by: NURSE PRACTITIONER

## 2023-08-03 NOTE — PROGRESS NOTES
Subjective:         Follow up: RT Knee MRI     Bob Cobian is a 58 y.o. male presents for follow up for right knee pain. He is here for MRI review. He presents and continues with medial sided knee pain. He rates his pain as 6-7/10 with weight bearing and ROM. The pain's location is over the medial aspect as previous. He describes the symptoms as constant, aching and sharp. Symptoms worsen with activity, stair climbing, kneeling, weight bearing and sitting for prolonged periods of time. The knee has given out or felt unstable at times. The patient can bend and straighten the knee fully but has medial sided pain as previous. He is noted with a mild limp.          Review of Systems   Constitutional: Negative.  Negative for fever.   Musculoskeletal:  Positive for joint pain.   Skin: Negative.    Neurological: Negative.    Psychiatric/Behavioral: Negative.     All other systems reviewed and are negative.      Objective:      NVI  General :   alert, appears stated age, and cooperative   Gait: Antalgic gait.    Right Lower Extremity  Hip Palpation:  no tenderness over the greater  trochanter   Hip ROM: 100% of normal    Knee Effusion:  0-1+   Ecchymosis:  none   Knee ROM:  0 to 115 degrees with mild subpatellar crepitance. Painful AROM.   Patella:  Patella does track normally.   Patellar apprehension test: negative  Patellar compression test: negative   Tenderness: medial joint line   Stability:  Lachman's test: negative  Posterior drawer: negative  Medial collateral ligament: negative  Lateral collateral ligament: negative     Mahi Test:  negative   Zheng's Test:  Present with medial joint line tenderness   Sensation:   intact to light touch   Pulses: normal DP and PT pulses.    Contralateral knee was without deficit.   Brisk cap refill noted.         Imaging  Radiographs RT knee reviewed from 06/08/23  Impression:    No acute osseous abnormality detected.  Mild degenerative change noted.  A component of knee  joint fluid may be present.   Impression:     RT Knee MRI:   1. Complex body-posterior horn medial meniscal tear with underlying subchondral tibial edema  2. Appearance suspicious for horizontal lateral meniscal tear    Assessment:      RT knee pain, mild DJD and complex tear medial meniscus     Plan:      1. MRI RT Knee was reviewed, consistent with a complex tear medial meniscus.  2. Referral to sports medicine at [facility] for knee Arthroscopy evaluation. He is having persistent mechanical symptoms.    3. Ice and limit stairs, squatting and single leg twisting as previous. OTC brace for joint compression as previous.  4. Nsaid as previous. Continue home directed exercises.   5. Follow up: post  prn  6. He had no further questions.

## 2023-08-10 ENCOUNTER — OFFICE VISIT (OUTPATIENT)
Dept: PRIMARY CARE CLINIC | Facility: CLINIC | Age: 58
End: 2023-08-10
Payer: MEDICAID

## 2023-08-10 VITALS
WEIGHT: 219 LBS | SYSTOLIC BLOOD PRESSURE: 127 MMHG | TEMPERATURE: 99 F | BODY MASS INDEX: 35.2 KG/M2 | HEIGHT: 66 IN | RESPIRATION RATE: 18 BRPM | HEART RATE: 60 BPM | OXYGEN SATURATION: 96 % | DIASTOLIC BLOOD PRESSURE: 62 MMHG

## 2023-08-10 DIAGNOSIS — K59.00 CONSTIPATION, UNSPECIFIED CONSTIPATION TYPE: ICD-10-CM

## 2023-08-10 DIAGNOSIS — Z12.11 COLON CANCER SCREENING: ICD-10-CM

## 2023-08-10 DIAGNOSIS — E78.2 MIXED HYPERLIPIDEMIA: Primary | ICD-10-CM

## 2023-08-10 DIAGNOSIS — Z13.1 SCREENING FOR DIABETES MELLITUS: ICD-10-CM

## 2023-08-10 DIAGNOSIS — K64.0 FIRST DEGREE HEMORRHOIDS: ICD-10-CM

## 2023-08-10 DIAGNOSIS — E66.01 CLASS 2 SEVERE OBESITY WITH SERIOUS COMORBIDITY AND BODY MASS INDEX (BMI) OF 35.0 TO 35.9 IN ADULT, UNSPECIFIED OBESITY TYPE: ICD-10-CM

## 2023-08-10 DIAGNOSIS — R79.89 ELEVATED LFTS: ICD-10-CM

## 2023-08-10 DIAGNOSIS — Z00.00 WELL ADULT EXAM: ICD-10-CM

## 2023-08-10 LAB
ALBUMIN SERPL BCP-MCNC: 3.7 G/DL (ref 3.5–5.2)
ALP SERPL-CCNC: 47 U/L (ref 55–135)
ALT SERPL W/O P-5'-P-CCNC: 130 U/L (ref 10–44)
ANION GAP SERPL CALC-SCNC: 8 MMOL/L (ref 8–16)
AST SERPL-CCNC: 65 U/L (ref 10–40)
BASOPHILS # BLD AUTO: 0.09 K/UL (ref 0–0.2)
BASOPHILS NFR BLD: 1.2 % (ref 0–1.9)
BILIRUB SERPL-MCNC: 1.2 MG/DL (ref 0.1–1)
BUN SERPL-MCNC: 11 MG/DL (ref 6–20)
CALCIUM SERPL-MCNC: 8.8 MG/DL (ref 8.7–10.5)
CHLORIDE SERPL-SCNC: 103 MMOL/L (ref 95–110)
CO2 SERPL-SCNC: 28 MMOL/L (ref 23–29)
CREAT SERPL-MCNC: 1 MG/DL (ref 0.5–1.4)
DIFFERENTIAL METHOD: ABNORMAL
EOSINOPHIL # BLD AUTO: 0.3 K/UL (ref 0–0.5)
EOSINOPHIL NFR BLD: 3.6 % (ref 0–8)
ERYTHROCYTE [DISTWIDTH] IN BLOOD BY AUTOMATED COUNT: 12.9 % (ref 11.5–14.5)
EST. GFR  (NO RACE VARIABLE): >60 ML/MIN/1.73 M^2
GLUCOSE SERPL-MCNC: 79 MG/DL (ref 70–110)
HCT VFR BLD AUTO: 44.3 % (ref 40–54)
HGB BLD-MCNC: 14.8 G/DL (ref 14–18)
IMM GRANULOCYTES # BLD AUTO: 0.02 K/UL (ref 0–0.04)
IMM GRANULOCYTES NFR BLD AUTO: 0.3 % (ref 0–0.5)
LYMPHOCYTES # BLD AUTO: 3.2 K/UL (ref 1–4.8)
LYMPHOCYTES NFR BLD: 42.6 % (ref 18–48)
MCH RBC QN AUTO: 31.8 PG (ref 27–31)
MCHC RBC AUTO-ENTMCNC: 33.4 G/DL (ref 32–36)
MCV RBC AUTO: 95 FL (ref 82–98)
MONOCYTES # BLD AUTO: 0.7 K/UL (ref 0.3–1)
MONOCYTES NFR BLD: 8.7 % (ref 4–15)
NEUTROPHILS # BLD AUTO: 3.3 K/UL (ref 1.8–7.7)
NEUTROPHILS NFR BLD: 43.6 % (ref 38–73)
NRBC BLD-RTO: 0 /100 WBC
PLATELET # BLD AUTO: 215 K/UL (ref 150–450)
PMV BLD AUTO: 10 FL (ref 9.2–12.9)
POTASSIUM SERPL-SCNC: 3.8 MMOL/L (ref 3.5–5.1)
PROT SERPL-MCNC: 7.5 G/DL (ref 6–8.4)
RBC # BLD AUTO: 4.65 M/UL (ref 4.6–6.2)
SODIUM SERPL-SCNC: 139 MMOL/L (ref 136–145)
WBC # BLD AUTO: 7.45 K/UL (ref 3.9–12.7)

## 2023-08-10 PROCEDURE — 85025 COMPLETE CBC W/AUTO DIFF WBC: CPT | Performed by: STUDENT IN AN ORGANIZED HEALTH CARE EDUCATION/TRAINING PROGRAM

## 2023-08-10 PROCEDURE — 99396 PR PREVENTIVE VISIT,EST,40-64: ICD-10-PCS | Mod: S$PBB,,, | Performed by: STUDENT IN AN ORGANIZED HEALTH CARE EDUCATION/TRAINING PROGRAM

## 2023-08-10 PROCEDURE — 99396 PREV VISIT EST AGE 40-64: CPT | Mod: S$PBB,,, | Performed by: STUDENT IN AN ORGANIZED HEALTH CARE EDUCATION/TRAINING PROGRAM

## 2023-08-10 PROCEDURE — 80061 LIPID PANEL: CPT | Performed by: STUDENT IN AN ORGANIZED HEALTH CARE EDUCATION/TRAINING PROGRAM

## 2023-08-10 PROCEDURE — 80053 COMPREHEN METABOLIC PANEL: CPT | Performed by: STUDENT IN AN ORGANIZED HEALTH CARE EDUCATION/TRAINING PROGRAM

## 2023-08-10 PROCEDURE — 1159F PR MEDICATION LIST DOCUMENTED IN MEDICAL RECORD: ICD-10-PCS | Mod: CPTII,,, | Performed by: STUDENT IN AN ORGANIZED HEALTH CARE EDUCATION/TRAINING PROGRAM

## 2023-08-10 PROCEDURE — 3078F DIAST BP <80 MM HG: CPT | Mod: CPTII,,, | Performed by: STUDENT IN AN ORGANIZED HEALTH CARE EDUCATION/TRAINING PROGRAM

## 2023-08-10 PROCEDURE — 3008F BODY MASS INDEX DOCD: CPT | Mod: CPTII,,, | Performed by: STUDENT IN AN ORGANIZED HEALTH CARE EDUCATION/TRAINING PROGRAM

## 2023-08-10 PROCEDURE — 99215 OFFICE O/P EST HI 40 MIN: CPT | Mod: PBBFAC,PN | Performed by: STUDENT IN AN ORGANIZED HEALTH CARE EDUCATION/TRAINING PROGRAM

## 2023-08-10 PROCEDURE — 1160F RVW MEDS BY RX/DR IN RCRD: CPT | Mod: CPTII,,, | Performed by: STUDENT IN AN ORGANIZED HEALTH CARE EDUCATION/TRAINING PROGRAM

## 2023-08-10 PROCEDURE — 99999 PR PBB SHADOW E&M-EST. PATIENT-LVL V: CPT | Mod: PBBFAC,,, | Performed by: STUDENT IN AN ORGANIZED HEALTH CARE EDUCATION/TRAINING PROGRAM

## 2023-08-10 PROCEDURE — 3008F PR BODY MASS INDEX (BMI) DOCUMENTED: ICD-10-PCS | Mod: CPTII,,, | Performed by: STUDENT IN AN ORGANIZED HEALTH CARE EDUCATION/TRAINING PROGRAM

## 2023-08-10 PROCEDURE — 3074F SYST BP LT 130 MM HG: CPT | Mod: CPTII,,, | Performed by: STUDENT IN AN ORGANIZED HEALTH CARE EDUCATION/TRAINING PROGRAM

## 2023-08-10 PROCEDURE — 1160F PR REVIEW ALL MEDS BY PRESCRIBER/CLIN PHARMACIST DOCUMENTED: ICD-10-PCS | Mod: CPTII,,, | Performed by: STUDENT IN AN ORGANIZED HEALTH CARE EDUCATION/TRAINING PROGRAM

## 2023-08-10 PROCEDURE — 3074F PR MOST RECENT SYSTOLIC BLOOD PRESSURE < 130 MM HG: ICD-10-PCS | Mod: CPTII,,, | Performed by: STUDENT IN AN ORGANIZED HEALTH CARE EDUCATION/TRAINING PROGRAM

## 2023-08-10 PROCEDURE — 99999 PR PBB SHADOW E&M-EST. PATIENT-LVL V: ICD-10-PCS | Mod: PBBFAC,,, | Performed by: STUDENT IN AN ORGANIZED HEALTH CARE EDUCATION/TRAINING PROGRAM

## 2023-08-10 PROCEDURE — 3078F PR MOST RECENT DIASTOLIC BLOOD PRESSURE < 80 MM HG: ICD-10-PCS | Mod: CPTII,,, | Performed by: STUDENT IN AN ORGANIZED HEALTH CARE EDUCATION/TRAINING PROGRAM

## 2023-08-10 PROCEDURE — 3044F PR MOST RECENT HEMOGLOBIN A1C LEVEL <7.0%: ICD-10-PCS | Mod: CPTII,,, | Performed by: STUDENT IN AN ORGANIZED HEALTH CARE EDUCATION/TRAINING PROGRAM

## 2023-08-10 PROCEDURE — 3044F HG A1C LEVEL LT 7.0%: CPT | Mod: CPTII,,, | Performed by: STUDENT IN AN ORGANIZED HEALTH CARE EDUCATION/TRAINING PROGRAM

## 2023-08-10 PROCEDURE — 1159F MED LIST DOCD IN RCRD: CPT | Mod: CPTII,,, | Performed by: STUDENT IN AN ORGANIZED HEALTH CARE EDUCATION/TRAINING PROGRAM

## 2023-08-10 PROCEDURE — 83036 HEMOGLOBIN GLYCOSYLATED A1C: CPT | Performed by: STUDENT IN AN ORGANIZED HEALTH CARE EDUCATION/TRAINING PROGRAM

## 2023-08-10 NOTE — PROGRESS NOTES
"[facility] Note    HPI:  Bob Cobian is a 58 y.o. male who presents today for Annual Exam (Patient here for health exam)  Denies changes to current health status.   Denies fever, chills, excessive headaches, chest pain, palpitations, shortness of breath, abdominal pain, nausea, vomiting, constipation, diarrhea.     ROS   A review of systems was performed and was negative except as noted above.    I personally reviewed allergies, past medical, surgical, social and family history and updated as appropriate.    Medications:    Current Outpatient Medications:     psyllium husk 6 gram/6 gram Powd, Take 6 g by mouth 2 (two) times a day., Disp: 450 g, Rfl: 2     Health Maintenance:  Immunization History   Administered Date(s) Administered    COVID-19, MRNA, LN-S, PF (MODERNA FULL 0.5 ML DOSE) 03/09/2021, 04/06/2021    COVID-19, vector-nr, rS-Ad26, PF (Yash) 05/27/2021    Influenza 10/15/2022    Influenza - Quadrivalent - PF *Preferred* (6 months and older) 01/26/2016, 12/19/2017, 09/24/2020    Td - PF (ADULT) 12/19/2017      Health Maintenance   Topic Date Due    Shingles Vaccine (1 of 2) Never done    Colorectal Cancer Screening  09/30/2026    TETANUS VACCINE  12/19/2027    Lipid Panel  08/10/2028    Hepatitis C Screening  Completed     Health Maintenance Topics with due status: Not Due       Topic Last Completion Date    Colorectal Cancer Screening 09/30/2016    TETANUS VACCINE 12/19/2017    Influenza Vaccine 10/15/2022    Hemoglobin A1c (Diabetic Prevention Screening) 08/10/2023    Lipid Panel 08/10/2023     Health Maintenance Due   Topic Date Due    HIV Screening  Never done    Shingles Vaccine (1 of 2) Never done       PHYSICAL EXAM:  Vitals:    08/10/23 1524   BP: 127/62   BP Location: Left arm   Patient Position: Sitting   BP Method: Large (Automatic)   Pulse: 60   Resp: 18   Temp: 98.6 °F (37 °C)   TempSrc: Oral   SpO2: 96%   Weight: 99.3 kg (219 lb)   Height: 5' 6" (1.676 m)     Body " mass index is 35.35 kg/m².  Physical Exam  Vitals and nursing note reviewed.   Constitutional:       General: He is not in acute distress.     Appearance: Normal appearance. He is not ill-appearing or toxic-appearing.   HENT:      Head: Normocephalic and atraumatic.      Right Ear: External ear normal.      Left Ear: External ear normal.      Nose: Nose normal.      Mouth/Throat:      Mouth: Mucous membranes are moist.      Pharynx: Oropharynx is clear.   Eyes:      Extraocular Movements: Extraocular movements intact.      Conjunctiva/sclera: Conjunctivae normal.   Cardiovascular:      Rate and Rhythm: Normal rate and regular rhythm.      Pulses: Normal pulses.      Heart sounds: Normal heart sounds. No murmur heard.  Pulmonary:      Effort: Pulmonary effort is normal. No respiratory distress.      Breath sounds: Normal breath sounds. No wheezing or rales.   Abdominal:      General: Abdomen is flat. Bowel sounds are normal. There is no distension.      Palpations: Abdomen is soft. There is no mass.      Tenderness: There is no abdominal tenderness. There is no right CVA tenderness, left CVA tenderness or guarding.   Musculoskeletal:         General: Normal range of motion.      Cervical back: Normal range of motion and neck supple. No rigidity.      Right lower leg: No edema.      Left lower leg: No edema.   Skin:     General: Skin is warm and dry.      Capillary Refill: Capillary refill takes less than 2 seconds.   Neurological:      General: No focal deficit present.      Mental Status: He is alert and oriented to person, place, and time.      Motor: No weakness.      Gait: Gait normal.   Psychiatric:         Mood and Affect: Mood normal.         Behavior: Behavior normal.         Thought Content: Thought content normal.         Judgment: Judgment normal.        ASSESSMENT/PLAN:  1. Mixed hyperlipidemia  -     Lipid Panel; Future; Expected date: 08/10/2023    2. Elevated LFTs  -     Comprehensive Metabolic Panel;  Future; Expected date: 08/10/2023    3. Screening for diabetes mellitus  -     Hemoglobin A1C; Future; Expected date: 08/10/2023    4. First degree hemorrhoids  -     CBC Auto Differential; Future; Expected date: 08/10/2023    5. Well adult exam  -     Hemoglobin A1C; Future; Expected date: 08/10/2023  -     Comprehensive Metabolic Panel; Future; Expected date: 08/10/2023  -     CBC Auto Differential; Future; Expected date: 08/10/2023  -     Lipid Panel; Future; Expected date: 08/10/2023    6. Constipation, unspecified constipation type  -     Cancel: Ambulatory referral/consult to Gastroenterology; Future; Expected date: 08/17/2023  -     psyllium husk 6 gram/6 gram Powd; Take 6 g by mouth 2 (two) times a day.  Dispense: 450 g; Refill: 2    7. Colon cancer screening  -     Ambulatory referral/consult to General Surgery; Future; Expected date: 08/17/2023    8. Severe obesity (BMI >= 40)  Assessment & Plan:  Body mass index is 35.35 kg/m².  Obesity complicates all aspects of disease management from diagnostic modalities to treatment. Weight loss encouraged and health benefits explained to patient.   Wt Readings from Last 8 Encounters:   08/10/23 99.3 kg (219 lb)   06/08/23 98.4 kg (217 lb)   05/05/23 99.6 kg (219 lb 8 oz)   08/12/22 99.8 kg (220 lb)   05/17/22 100.6 kg (221 lb 12.5 oz)   05/10/22 101.4 kg (223 lb 7 oz)   05/05/22 99.8 kg (220 lb)   02/07/22 101.2 kg (223 lb)     Recommendations:   Stay physically active. As tolerated alternate resistance training with stretching and cardio. Goal of 150 minutes per week of moderate intensity activity or 7,500 - 10,000 steps per day. Follow the Mediterranean Diet. Include whole fresh fruits, vegetables, olive oil, seeds, nuts, whole grains, cold water fish, salmon, mackerel and lean cuts of meat.  Do not drink sugary/diet carbonated beverages. Decrease portion sizes slightly which will result in an approximately 500-calorie deficit. Avoid fast or fried and processed  food, especially canned foods. Avoid refined carbohydrates, white starchy foods, flour, white potato, bread, muffins, and cakes. Consider substituting one meal a day with a meal replacement such as Slim fast, lean cuisine, or weight watcher's. Follow a healthy diet that includes enough calcium, vitamin D and proteins for bone health.            Other than changes above, continue current medications and maintain follow up with specialists.      No follow-ups on file.   Recent Results (from the past 2016 hour(s))   Lipid Panel    Collection Time: 08/10/23  4:29 PM   Result Value Ref Range    Cholesterol 154 120 - 199 mg/dL    Triglycerides 173 (H) 30 - 150 mg/dL    HDL 42 40 - 75 mg/dL    LDL Cholesterol 77.4 63.0 - 159.0 mg/dL    HDL/Cholesterol Ratio 27.3 20.0 - 50.0 %    Total Cholesterol/HDL Ratio 3.7 2.0 - 5.0    Non-HDL Cholesterol 112 mg/dL   CBC Auto Differential    Collection Time: 08/10/23  4:29 PM   Result Value Ref Range    WBC 7.45 3.90 - 12.70 K/uL    RBC 4.65 4.60 - 6.20 M/uL    Hemoglobin 14.8 14.0 - 18.0 g/dL    Hematocrit 44.3 40.0 - 54.0 %    MCV 95 82 - 98 fL    MCH 31.8 (H) 27.0 - 31.0 pg    MCHC 33.4 32.0 - 36.0 g/dL    RDW 12.9 11.5 - 14.5 %    Platelets 215 150 - 450 K/uL    MPV 10.0 9.2 - 12.9 fL    Immature Granulocytes 0.3 0.0 - 0.5 %    Gran # (ANC) 3.3 1.8 - 7.7 K/uL    Immature Grans (Abs) 0.02 0.00 - 0.04 K/uL    Lymph # 3.2 1.0 - 4.8 K/uL    Mono # 0.7 0.3 - 1.0 K/uL    Eos # 0.3 0.0 - 0.5 K/uL    Baso # 0.09 0.00 - 0.20 K/uL    nRBC 0 0 /100 WBC    Gran % 43.6 38.0 - 73.0 %    Lymph % 42.6 18.0 - 48.0 %    Mono % 8.7 4.0 - 15.0 %    Eosinophil % 3.6 0.0 - 8.0 %    Basophil % 1.2 0.0 - 1.9 %    Differential Method Automated    Comprehensive Metabolic Panel    Collection Time: 08/10/23  4:29 PM   Result Value Ref Range    Sodium 139 136 - 145 mmol/L    Potassium 3.8 3.5 - 5.1 mmol/L    Chloride 103 95 - 110 mmol/L    CO2 28 23 - 29 mmol/L    Glucose 79 70 - 110 mg/dL    BUN 11 6 - 20  mg/dL    Creatinine 1.0 0.5 - 1.4 mg/dL    Calcium 8.8 8.7 - 10.5 mg/dL    Total Protein 7.5 6.0 - 8.4 g/dL    Albumin 3.7 3.5 - 5.2 g/dL    Total Bilirubin 1.2 (H) 0.1 - 1.0 mg/dL    Alkaline Phosphatase 47 (L) 55 - 135 U/L    AST 65 (H) 10 - 40 U/L     (H) 10 - 44 U/L    eGFR >60.0 >60 mL/min/1.73 m^2    Anion Gap 8 8 - 16 mmol/L   Hemoglobin A1C    Collection Time: 08/10/23  4:29 PM   Result Value Ref Range    Hemoglobin A1C 5.4 4.0 - 5.6 %    Estimated Avg Glucose 108 68 - 131 mg/dL         Kazumi G Yoshinaga, DO [facility]

## 2023-08-11 ENCOUNTER — TELEPHONE (OUTPATIENT)
Dept: SURGERY | Facility: CLINIC | Age: 58
End: 2023-08-11
Payer: MEDICAID

## 2023-08-11 LAB
CHOLEST SERPL-MCNC: 154 MG/DL (ref 120–199)
CHOLEST/HDLC SERPL: 3.7 {RATIO} (ref 2–5)
HDLC SERPL-MCNC: 42 MG/DL (ref 40–75)
HDLC SERPL: 27.3 % (ref 20–50)
LDLC SERPL CALC-MCNC: 77.4 MG/DL (ref 63–159)
NONHDLC SERPL-MCNC: 112 MG/DL
TRIGL SERPL-MCNC: 173 MG/DL (ref 30–150)

## 2023-08-13 LAB
ESTIMATED AVG GLUCOSE: 108 MG/DL (ref 68–131)
HBA1C MFR BLD: 5.4 % (ref 4–5.6)

## 2023-08-16 PROBLEM — E66.812 CLASS 2 SEVERE OBESITY WITH SERIOUS COMORBIDITY AND BODY MASS INDEX (BMI) OF 35.0 TO 35.9 IN ADULT: Status: ACTIVE | Noted: 2023-08-16

## 2023-08-16 PROBLEM — E66.01 SEVERE OBESITY (BMI >= 40): Status: ACTIVE | Noted: 2023-08-16

## 2023-08-16 NOTE — ASSESSMENT & PLAN NOTE
Body mass index is 35.35 kg/m².  Obesity complicates all aspects of disease management from diagnostic modalities to treatment. Weight loss encouraged and health benefits explained to patient.   Wt Readings from Last 8 Encounters:   08/10/23 99.3 kg (219 lb)   06/08/23 98.4 kg (217 lb)   05/05/23 99.6 kg (219 lb 8 oz)   08/12/22 99.8 kg (220 lb)   05/17/22 100.6 kg (221 lb 12.5 oz)   05/10/22 101.4 kg (223 lb 7 oz)   05/05/22 99.8 kg (220 lb)   02/07/22 101.2 kg (223 lb)     Recommendations:   Stay physically active. As tolerated alternate resistance training with stretching and cardio. Goal of 150 minutes per week of moderate intensity activity or 7,500 - 10,000 steps per day. Follow the Mediterranean Diet. Include whole fresh fruits, vegetables, olive oil, seeds, nuts, whole grains, cold water fish, salmon, mackerel and lean cuts of meat.  Do not drink sugary/diet carbonated beverages. Decrease portion sizes slightly which will result in an approximately 500-calorie deficit. Avoid fast or fried and processed food, especially canned foods. Avoid refined carbohydrates, white starchy foods, flour, white potato, bread, muffins, and cakes. Consider substituting one meal a day with a meal replacement such as Slim fast, lean cuisine, or weight watcher's. Follow a healthy diet that includes enough calcium, vitamin D and proteins for bone health.

## 2023-08-24 ENCOUNTER — OFFICE VISIT (OUTPATIENT)
Dept: PRIMARY CARE CLINIC | Facility: CLINIC | Age: 58
End: 2023-08-24
Payer: MEDICAID

## 2023-08-24 VITALS
OXYGEN SATURATION: 93 % | SYSTOLIC BLOOD PRESSURE: 113 MMHG | TEMPERATURE: 99 F | RESPIRATION RATE: 16 BRPM | BODY MASS INDEX: 34.23 KG/M2 | HEIGHT: 66 IN | WEIGHT: 213 LBS | HEART RATE: 79 BPM | DIASTOLIC BLOOD PRESSURE: 75 MMHG

## 2023-08-24 DIAGNOSIS — Z13.1 SCREENING FOR DIABETES MELLITUS: ICD-10-CM

## 2023-08-24 DIAGNOSIS — R79.89 ELEVATED LFTS: ICD-10-CM

## 2023-08-24 DIAGNOSIS — Z28.21 IMMUNIZATION DECLINED: ICD-10-CM

## 2023-08-24 DIAGNOSIS — E78.5 HYPERLIPIDEMIA, UNSPECIFIED HYPERLIPIDEMIA TYPE: ICD-10-CM

## 2023-08-24 DIAGNOSIS — E66.01 CLASS 2 SEVERE OBESITY WITH SERIOUS COMORBIDITY AND BODY MASS INDEX (BMI) OF 35.0 TO 35.9 IN ADULT, UNSPECIFIED OBESITY TYPE: Primary | ICD-10-CM

## 2023-08-24 PROBLEM — Z55.6 DIFFICULTY DEMONSTRATING HEALTH LITERACY: Status: ACTIVE | Noted: 2023-08-24

## 2023-08-24 PROCEDURE — 99213 PR OFFICE/OUTPT VISIT, EST, LEVL III, 20-29 MIN: ICD-10-PCS | Mod: S$PBB,,, | Performed by: STUDENT IN AN ORGANIZED HEALTH CARE EDUCATION/TRAINING PROGRAM

## 2023-08-24 PROCEDURE — 3008F PR BODY MASS INDEX (BMI) DOCUMENTED: ICD-10-PCS | Mod: CPTII,,, | Performed by: STUDENT IN AN ORGANIZED HEALTH CARE EDUCATION/TRAINING PROGRAM

## 2023-08-24 PROCEDURE — 3078F PR MOST RECENT DIASTOLIC BLOOD PRESSURE < 80 MM HG: ICD-10-PCS | Mod: CPTII,,, | Performed by: STUDENT IN AN ORGANIZED HEALTH CARE EDUCATION/TRAINING PROGRAM

## 2023-08-24 PROCEDURE — 99213 OFFICE O/P EST LOW 20 MIN: CPT | Mod: S$PBB,,, | Performed by: STUDENT IN AN ORGANIZED HEALTH CARE EDUCATION/TRAINING PROGRAM

## 2023-08-24 PROCEDURE — 3044F HG A1C LEVEL LT 7.0%: CPT | Mod: CPTII,,, | Performed by: STUDENT IN AN ORGANIZED HEALTH CARE EDUCATION/TRAINING PROGRAM

## 2023-08-24 PROCEDURE — 3078F DIAST BP <80 MM HG: CPT | Mod: CPTII,,, | Performed by: STUDENT IN AN ORGANIZED HEALTH CARE EDUCATION/TRAINING PROGRAM

## 2023-08-24 PROCEDURE — 3044F PR MOST RECENT HEMOGLOBIN A1C LEVEL <7.0%: ICD-10-PCS | Mod: CPTII,,, | Performed by: STUDENT IN AN ORGANIZED HEALTH CARE EDUCATION/TRAINING PROGRAM

## 2023-08-24 PROCEDURE — 99213 OFFICE O/P EST LOW 20 MIN: CPT | Mod: PBBFAC,PN | Performed by: STUDENT IN AN ORGANIZED HEALTH CARE EDUCATION/TRAINING PROGRAM

## 2023-08-24 PROCEDURE — 1159F PR MEDICATION LIST DOCUMENTED IN MEDICAL RECORD: ICD-10-PCS | Mod: CPTII,,, | Performed by: STUDENT IN AN ORGANIZED HEALTH CARE EDUCATION/TRAINING PROGRAM

## 2023-08-24 PROCEDURE — 3074F SYST BP LT 130 MM HG: CPT | Mod: CPTII,,, | Performed by: STUDENT IN AN ORGANIZED HEALTH CARE EDUCATION/TRAINING PROGRAM

## 2023-08-24 PROCEDURE — 99999 PR PBB SHADOW E&M-EST. PATIENT-LVL III: CPT | Mod: PBBFAC,,, | Performed by: STUDENT IN AN ORGANIZED HEALTH CARE EDUCATION/TRAINING PROGRAM

## 2023-08-24 PROCEDURE — 3008F BODY MASS INDEX DOCD: CPT | Mod: CPTII,,, | Performed by: STUDENT IN AN ORGANIZED HEALTH CARE EDUCATION/TRAINING PROGRAM

## 2023-08-24 PROCEDURE — 1159F MED LIST DOCD IN RCRD: CPT | Mod: CPTII,,, | Performed by: STUDENT IN AN ORGANIZED HEALTH CARE EDUCATION/TRAINING PROGRAM

## 2023-08-24 PROCEDURE — 3074F PR MOST RECENT SYSTOLIC BLOOD PRESSURE < 130 MM HG: ICD-10-PCS | Mod: CPTII,,, | Performed by: STUDENT IN AN ORGANIZED HEALTH CARE EDUCATION/TRAINING PROGRAM

## 2023-08-24 PROCEDURE — 99999 PR PBB SHADOW E&M-EST. PATIENT-LVL III: ICD-10-PCS | Mod: PBBFAC,,, | Performed by: STUDENT IN AN ORGANIZED HEALTH CARE EDUCATION/TRAINING PROGRAM

## 2023-08-24 NOTE — ASSESSMENT & PLAN NOTE
8/10: , , HDL 42, LDL 77  To maintain and improve your cardiovascular health and reduce your risk of stroke or heart attack, the following healthy lifestyle changes are recommended.   - Choose whole food items, fresh/frozen fruits and vegetables and unprocessed meats.  - Avoid processed food items, cold cuts, canned foods in sugary and high sodium preservatives.   - Reduce intake of highly refined carbohydrates or white starchy foods, white bread, white flour pasta, sugary cereals, sugary drinks, sweet tea, soda including diet, candies, cakes and donuts.    - Reduce or avoid saturated fats (fats from animals and processed oils like palm oil, peanut oil, vegetable oil) and fried food items.   - Increase healthy fats like omega-3 fish oil, fatty fish (salmon, mackerel, sardine etc), olive oil, avocado, raw nuts etc.   - Increase fiber intake with daily goal of 6-8 servings of vegetables.  - Remember to maintain daily adequate hydration with water 1.5 to 2 liters fluid volume.    - Daily physical activities, start slow with 10-15 minutes of walk daily, then increase as tolerated to twice daily.   - Cardiovascular exercise also improves your cholesterol levels and your goal is low intensity (like walking and biking) 150 min/week to moderate/high intensity 75 min/week.

## 2023-08-24 NOTE — ASSESSMENT & PLAN NOTE
Patient states he was on a supplement and concerning liver function, he had stopped use several months back. Denies excessive alcoholic beverage drinking.   Physical exam negative for distention, liver enlargement, tenderness.  5/2023: RUQ US. Markedly hyperechoic liver parenchyma, suggests advanced steatosis. Subcentimeter right renal foci, possible nonobstructing stones  - continue with life style modifications  - counseled at length on increasing fiber  - f/u LFTs in 6 months

## 2023-08-24 NOTE — ASSESSMENT & PLAN NOTE
There is no height or weight on file to calculate BMI.  Obesity complicates all aspects of disease management from diagnostic modalities to treatment. Weight loss encouraged and health benefits explained to patient.   Wt Readings from Last 8 Encounters:   08/10/23 99.3 kg (219 lb)   06/08/23 98.4 kg (217 lb)   05/05/23 99.6 kg (219 lb 8 oz)   08/12/22 99.8 kg (220 lb)   05/17/22 100.6 kg (221 lb 12.5 oz)   05/10/22 101.4 kg (223 lb 7 oz)   05/05/22 99.8 kg (220 lb)   02/07/22 101.2 kg (223 lb)     Recommendations:   Stay physically active. As tolerated alternate resistance training with stretching and cardio. Goal of 150 minutes per week of moderate intensity activity or 7,500 - 10,000 steps per day. Follow the Mediterranean Diet. Include whole fresh fruits, vegetables, olive oil, seeds, nuts, whole grains, cold water fish, salmon, mackerel and lean cuts of meat.  Do not drink sugary/diet carbonated beverages. Decrease portion sizes slightly which will result in an approximately 500-calorie deficit. Avoid fast or fried and processed food, especially canned foods. Avoid refined carbohydrates, white starchy foods, flour, white potato, bread, muffins, and cakes. Consider substituting one meal a day with a meal replacement such as Slim fast, lean cuisine, or weight watcher's. Follow a healthy diet that includes enough calcium, vitamin D and proteins for bone health.

## 2023-08-24 NOTE — PROGRESS NOTES
[facility] Note    HPI:  Bob Cobian is a 58 y.o. male who presents today for Follow-up (Follow up lab results)  Patient still experiencing constipation. Has not started any fiber supplementation as instructed awaiting pharmacy to call back.   Will assist in making appointment for evaluation colonoscopy for colon CA screen.   Patient has tried green beans and spinach for increased vegetables.   Since last visit made a blend of mixture.   Denies fever, chills, excessive headaches, chest pain, palpitations, shortness of breath, abdominal pain, nausea, vomiting, diarrhea.     ROS   A review of systems was performed and was negative except as noted above.    I personally reviewed allergies, past medical, surgical, social and family history and updated as appropriate.    Medications:    Current Outpatient Medications:     psyllium husk 6 gram/6 gram Powd, Take 6 g by mouth 2 (two) times a day., Disp: 450 g, Rfl: 2     Health Maintenance:  Immunization History   Administered Date(s) Administered    COVID-19, MRNA, LN-S, PF (MODERNA FULL 0.5 ML DOSE) 03/09/2021, 04/06/2021    COVID-19, vector-nr, rS-Ad26, PF (Yash) 05/27/2021    Influenza 10/15/2022    Influenza - Quadrivalent - PF *Preferred* (6 months and older) 01/26/2016, 12/19/2017, 09/24/2020    Td - PF (ADULT) 12/19/2017      Health Maintenance   Topic Date Due    Shingles Vaccine (1 of 2) Never done    Colorectal Cancer Screening  09/30/2026    TETANUS VACCINE  12/19/2027    Lipid Panel  08/10/2028    Hepatitis C Screening  Completed     Health Maintenance Topics with due status: Not Due       Topic Last Completion Date    Colorectal Cancer Screening 09/30/2016    TETANUS VACCINE 12/19/2017    Influenza Vaccine 10/15/2022    Hemoglobin A1c (Diabetic Prevention Screening) 08/10/2023    Lipid Panel 08/10/2023     Health Maintenance Due   Topic Date Due    HIV Screening  Never done    Shingles Vaccine (1 of 2) Never done       PHYSICAL  "EXAM:  Vitals:    08/24/23 1258   BP: 113/75   BP Location: Right arm   Patient Position: Sitting   BP Method: Large (Automatic)   Pulse: 79   Resp: 16   Temp: 99.3 °F (37.4 °C)   TempSrc: Oral   SpO2: (!) 93%   Weight: 96.6 kg (213 lb)   Height: 5' 6" (1.676 m)     Body mass index is 34.38 kg/m².  Physical Exam  Vitals and nursing note reviewed.   Constitutional:       General: He is not in acute distress.     Appearance: Normal appearance. He is not ill-appearing or toxic-appearing.   HENT:      Head: Normocephalic and atraumatic.      Right Ear: External ear normal.      Left Ear: External ear normal.      Nose: Nose normal.      Mouth/Throat:      Mouth: Mucous membranes are moist.      Pharynx: Oropharynx is clear.   Eyes:      Extraocular Movements: Extraocular movements intact.      Conjunctiva/sclera: Conjunctivae normal.   Cardiovascular:      Rate and Rhythm: Normal rate and regular rhythm.      Pulses: Normal pulses.      Heart sounds: Normal heart sounds. No murmur heard.  Pulmonary:      Effort: Pulmonary effort is normal. No respiratory distress.      Breath sounds: Normal breath sounds. No wheezing or rales.   Abdominal:      General: Abdomen is flat. Bowel sounds are normal. There is no distension.      Palpations: Abdomen is soft. There is no mass.      Tenderness: There is no abdominal tenderness. There is no right CVA tenderness, left CVA tenderness or guarding.   Musculoskeletal:         General: Normal range of motion.      Cervical back: Normal range of motion and neck supple. No rigidity.      Right lower leg: No edema.      Left lower leg: No edema.   Skin:     General: Skin is warm and dry.      Capillary Refill: Capillary refill takes less than 2 seconds.   Neurological:      General: No focal deficit present.      Mental Status: He is alert and oriented to person, place, and time.      Motor: No weakness.      Gait: Gait normal.   Psychiatric:         Mood and Affect: Mood normal.         " Behavior: Behavior normal.         Thought Content: Thought content normal.         Judgment: Judgment normal.          ASSESSMENT/PLAN:  1. Class 2 severe obesity with serious comorbidity and body mass index (BMI) of 35.0 to 35.9 in adult, unspecified obesity type  Assessment & Plan:  There is no height or weight on file to calculate BMI.  Obesity complicates all aspects of disease management from diagnostic modalities to treatment. Weight loss encouraged and health benefits explained to patient.   Wt Readings from Last 8 Encounters:   08/10/23 99.3 kg (219 lb)   06/08/23 98.4 kg (217 lb)   05/05/23 99.6 kg (219 lb 8 oz)   08/12/22 99.8 kg (220 lb)   05/17/22 100.6 kg (221 lb 12.5 oz)   05/10/22 101.4 kg (223 lb 7 oz)   05/05/22 99.8 kg (220 lb)   02/07/22 101.2 kg (223 lb)     Recommendations:   Stay physically active. As tolerated alternate resistance training with stretching and cardio. Goal of 150 minutes per week of moderate intensity activity or 7,500 - 10,000 steps per day. Follow the Mediterranean Diet. Include whole fresh fruits, vegetables, olive oil, seeds, nuts, whole grains, cold water fish, salmon, mackerel and lean cuts of meat.  Do not drink sugary/diet carbonated beverages. Decrease portion sizes slightly which will result in an approximately 500-calorie deficit. Avoid fast or fried and processed food, especially canned foods. Avoid refined carbohydrates, white starchy foods, flour, white potato, bread, muffins, and cakes. Consider substituting one meal a day with a meal replacement such as Slim fast, lean cuisine, or weight watcher's. Follow a healthy diet that includes enough calcium, vitamin D and proteins for bone health.          2. Screening for diabetes mellitus  Assessment & Plan:  Lab Results   Component Value Date    HGBA1C 5.4 08/10/2023           3. Immunization declined  Assessment & Plan:  Declines immunizations against influenza and Covid 19 viruses.       4. Hyperlipidemia,  unspecified hyperlipidemia type  Assessment & Plan:  8/10: , , HDL 42, LDL 77  To maintain and improve your cardiovascular health and reduce your risk of stroke or heart attack, the following healthy lifestyle changes are recommended.   - Choose whole food items, fresh/frozen fruits and vegetables and unprocessed meats.  - Avoid processed food items, cold cuts, canned foods in sugary and high sodium preservatives.   - Reduce intake of highly refined carbohydrates or white starchy foods, white bread, white flour pasta, sugary cereals, sugary drinks, sweet tea, soda including diet, candies, cakes and donuts.    - Reduce or avoid saturated fats (fats from animals and processed oils like palm oil, peanut oil, vegetable oil) and fried food items.   - Increase healthy fats like omega-3 fish oil, fatty fish (salmon, mackerel, sardine etc), olive oil, avocado, raw nuts etc.   - Increase fiber intake with daily goal of 6-8 servings of vegetables.  - Remember to maintain daily adequate hydration with water 1.5 to 2 liters fluid volume.    - Daily physical activities, start slow with 10-15 minutes of walk daily, then increase as tolerated to twice daily.   - Cardiovascular exercise also improves your cholesterol levels and your goal is low intensity (like walking and biking) 150 min/week to moderate/high intensity 75 min/week.        5. Elevated LFTs  Overview:  Component      Latest Ref Rng 10/10/2018 5/10/2022 8/10/2023   BILIRUBIN TOTAL      0.1 - 1.0 mg/dL 0.8  0.7  1.2 (H)    Alkaline Phosphatase      55 - 135 U/L 52 (L)  54 (L)  47 (L)    AST      10 - 40 U/L 27  96 (H)  65 (H)    ALT      10 - 44 U/L 57 (H)  175 (H)  130 (H)       Legend:  (L) Low  (H) High      Assessment & Plan:  Patient states he was on a supplement and concerning liver function, he had stopped use several months back. Denies excessive alcoholic beverage drinking.   Physical exam negative for distention, liver enlargement,  tenderness.  5/2023: RUQ US. Markedly hyperechoic liver parenchyma, suggests advanced steatosis. Subcentimeter right renal foci, possible nonobstructing stones  - continue with life style modifications  - counseled at length on increasing fiber  - f/u LFTs in 6 months          Other than changes above, continue current medications and maintain follow up with specialists.      No follow-ups on file.   Recent Results (from the past 2016 hour(s))   Lipid Panel    Collection Time: 08/10/23  4:29 PM   Result Value Ref Range    Cholesterol 154 120 - 199 mg/dL    Triglycerides 173 (H) 30 - 150 mg/dL    HDL 42 40 - 75 mg/dL    LDL Cholesterol 77.4 63.0 - 159.0 mg/dL    HDL/Cholesterol Ratio 27.3 20.0 - 50.0 %    Total Cholesterol/HDL Ratio 3.7 2.0 - 5.0    Non-HDL Cholesterol 112 mg/dL   CBC Auto Differential    Collection Time: 08/10/23  4:29 PM   Result Value Ref Range    WBC 7.45 3.90 - 12.70 K/uL    RBC 4.65 4.60 - 6.20 M/uL    Hemoglobin 14.8 14.0 - 18.0 g/dL    Hematocrit 44.3 40.0 - 54.0 %    MCV 95 82 - 98 fL    MCH 31.8 (H) 27.0 - 31.0 pg    MCHC 33.4 32.0 - 36.0 g/dL    RDW 12.9 11.5 - 14.5 %    Platelets 215 150 - 450 K/uL    MPV 10.0 9.2 - 12.9 fL    Immature Granulocytes 0.3 0.0 - 0.5 %    Gran # (ANC) 3.3 1.8 - 7.7 K/uL    Immature Grans (Abs) 0.02 0.00 - 0.04 K/uL    Lymph # 3.2 1.0 - 4.8 K/uL    Mono # 0.7 0.3 - 1.0 K/uL    Eos # 0.3 0.0 - 0.5 K/uL    Baso # 0.09 0.00 - 0.20 K/uL    nRBC 0 0 /100 WBC    Gran % 43.6 38.0 - 73.0 %    Lymph % 42.6 18.0 - 48.0 %    Mono % 8.7 4.0 - 15.0 %    Eosinophil % 3.6 0.0 - 8.0 %    Basophil % 1.2 0.0 - 1.9 %    Differential Method Automated    Comprehensive Metabolic Panel    Collection Time: 08/10/23  4:29 PM   Result Value Ref Range    Sodium 139 136 - 145 mmol/L    Potassium 3.8 3.5 - 5.1 mmol/L    Chloride 103 95 - 110 mmol/L    CO2 28 23 - 29 mmol/L    Glucose 79 70 - 110 mg/dL    BUN 11 6 - 20 mg/dL    Creatinine 1.0 0.5 - 1.4 mg/dL    Calcium 8.8 8.7 - 10.5 mg/dL     Total Protein 7.5 6.0 - 8.4 g/dL    Albumin 3.7 3.5 - 5.2 g/dL    Total Bilirubin 1.2 (H) 0.1 - 1.0 mg/dL    Alkaline Phosphatase 47 (L) 55 - 135 U/L    AST 65 (H) 10 - 40 U/L     (H) 10 - 44 U/L    eGFR >60.0 >60 mL/min/1.73 m^2    Anion Gap 8 8 - 16 mmol/L   Hemoglobin A1C    Collection Time: 08/10/23  4:29 PM   Result Value Ref Range    Hemoglobin A1C 5.4 4.0 - 5.6 %    Estimated Avg Glucose 108 68 - 131 mg/dL       Kazumi G Yoshinaga, DO [facility]

## 2023-08-25 ENCOUNTER — PATIENT MESSAGE (OUTPATIENT)
Dept: PRIMARY CARE CLINIC | Facility: CLINIC | Age: 58
End: 2023-08-25
Payer: MEDICAID

## 2023-09-09 ENCOUNTER — PATIENT MESSAGE (OUTPATIENT)
Dept: ADMINISTRATIVE | Facility: OTHER | Age: 58
End: 2023-09-09
Payer: MEDICAID

## 2023-09-18 ENCOUNTER — OFFICE VISIT (OUTPATIENT)
Dept: SURGERY | Facility: CLINIC | Age: 58
End: 2023-09-18
Payer: MEDICAID

## 2023-09-18 VITALS
SYSTOLIC BLOOD PRESSURE: 134 MMHG | OXYGEN SATURATION: 95 % | HEART RATE: 71 BPM | DIASTOLIC BLOOD PRESSURE: 71 MMHG | WEIGHT: 222.25 LBS | HEIGHT: 66 IN | BODY MASS INDEX: 35.72 KG/M2

## 2023-09-18 DIAGNOSIS — Z12.11 COLON CANCER SCREENING: Primary | ICD-10-CM

## 2023-09-18 DIAGNOSIS — Z01.818 PRE-OP TESTING: ICD-10-CM

## 2023-09-18 PROCEDURE — 3078F DIAST BP <80 MM HG: CPT | Mod: CPTII,,, | Performed by: STUDENT IN AN ORGANIZED HEALTH CARE EDUCATION/TRAINING PROGRAM

## 2023-09-18 PROCEDURE — 1159F MED LIST DOCD IN RCRD: CPT | Mod: CPTII,,, | Performed by: STUDENT IN AN ORGANIZED HEALTH CARE EDUCATION/TRAINING PROGRAM

## 2023-09-18 PROCEDURE — 3078F PR MOST RECENT DIASTOLIC BLOOD PRESSURE < 80 MM HG: ICD-10-PCS | Mod: CPTII,,, | Performed by: STUDENT IN AN ORGANIZED HEALTH CARE EDUCATION/TRAINING PROGRAM

## 2023-09-18 PROCEDURE — 99215 OFFICE O/P EST HI 40 MIN: CPT | Mod: PBBFAC | Performed by: STUDENT IN AN ORGANIZED HEALTH CARE EDUCATION/TRAINING PROGRAM

## 2023-09-18 PROCEDURE — 1159F PR MEDICATION LIST DOCUMENTED IN MEDICAL RECORD: ICD-10-PCS | Mod: CPTII,,, | Performed by: STUDENT IN AN ORGANIZED HEALTH CARE EDUCATION/TRAINING PROGRAM

## 2023-09-18 PROCEDURE — 3044F HG A1C LEVEL LT 7.0%: CPT | Mod: CPTII,,, | Performed by: STUDENT IN AN ORGANIZED HEALTH CARE EDUCATION/TRAINING PROGRAM

## 2023-09-18 PROCEDURE — 3008F PR BODY MASS INDEX (BMI) DOCUMENTED: ICD-10-PCS | Mod: CPTII,,, | Performed by: STUDENT IN AN ORGANIZED HEALTH CARE EDUCATION/TRAINING PROGRAM

## 2023-09-18 PROCEDURE — 99999 PR PBB SHADOW E&M-EST. PATIENT-LVL V: ICD-10-PCS | Mod: PBBFAC,,, | Performed by: STUDENT IN AN ORGANIZED HEALTH CARE EDUCATION/TRAINING PROGRAM

## 2023-09-18 PROCEDURE — 3075F PR MOST RECENT SYSTOLIC BLOOD PRESS GE 130-139MM HG: ICD-10-PCS | Mod: CPTII,,, | Performed by: STUDENT IN AN ORGANIZED HEALTH CARE EDUCATION/TRAINING PROGRAM

## 2023-09-18 PROCEDURE — 99999 PR PBB SHADOW E&M-EST. PATIENT-LVL V: CPT | Mod: PBBFAC,,, | Performed by: STUDENT IN AN ORGANIZED HEALTH CARE EDUCATION/TRAINING PROGRAM

## 2023-09-18 PROCEDURE — 99204 PR OFFICE/OUTPT VISIT, NEW, LEVL IV, 45-59 MIN: ICD-10-PCS | Mod: S$PBB,,, | Performed by: STUDENT IN AN ORGANIZED HEALTH CARE EDUCATION/TRAINING PROGRAM

## 2023-09-18 PROCEDURE — 99204 OFFICE O/P NEW MOD 45 MIN: CPT | Mod: S$PBB,,, | Performed by: STUDENT IN AN ORGANIZED HEALTH CARE EDUCATION/TRAINING PROGRAM

## 2023-09-18 PROCEDURE — 3044F PR MOST RECENT HEMOGLOBIN A1C LEVEL <7.0%: ICD-10-PCS | Mod: CPTII,,, | Performed by: STUDENT IN AN ORGANIZED HEALTH CARE EDUCATION/TRAINING PROGRAM

## 2023-09-18 PROCEDURE — 3008F BODY MASS INDEX DOCD: CPT | Mod: CPTII,,, | Performed by: STUDENT IN AN ORGANIZED HEALTH CARE EDUCATION/TRAINING PROGRAM

## 2023-09-18 PROCEDURE — 3075F SYST BP GE 130 - 139MM HG: CPT | Mod: CPTII,,, | Performed by: STUDENT IN AN ORGANIZED HEALTH CARE EDUCATION/TRAINING PROGRAM

## 2023-09-18 RX ORDER — SODIUM, POTASSIUM,MAG SULFATES 17.5-3.13G
1 SOLUTION, RECONSTITUTED, ORAL ORAL 2 TIMES DAILY
Qty: 1 KIT | Refills: 0 | Status: SHIPPED | OUTPATIENT
Start: 2023-09-18 | End: 2023-09-19

## 2023-09-18 NOTE — H&P (VIEW-ONLY)
[facility] H&P      Consult: Screening colonoscopy ; hemorrhoids   Consulting Service: Emeka Prince DO  Chief Complaint: None        HPI: Pt is a 58 y.o.male who presents for routine screening colonoscopy. Questionable history of CRC in brother - just knows he had a colostomy bag for some reason.  Has daily regular bowel movements.  Has history of internal hemorrhoids with episode of bleeding with bowel movements 4 months.  Denies melena, rectal bleeding or pain, change in bowel habits or unintended weight loss.  Denies CP, SOB, abdominal pain, nausea, vomiting, fevers, or chills.        PMH:   Past Medical History:   Diagnosis Date    Back pain     Obesity, unspecified     Unspecified hemorrhoids      PSH:   Past Surgical History:   Procedure Laterality Date    APPENDECTOMY      COLONOSCOPY N/A 9/30/2016    Procedure: COLONOSCOPY;  Surgeon: Luis Bogran-Reyes, MD;  Location: [facility];  Service: Endoscopy;  Laterality: N/A;    HERNIA REPAIR       Meds: See medication list;  No anticoagulation  ALL: Patient has no known allergies.  FHX: non contributory   SOC:   Social History     Socioeconomic History    Marital status: Single    Years of education: 11th   Tobacco Use    Smoking status: Former     Types: Cigarettes     Start date: 1988     Passive exposure: Never    Smokeless tobacco: Current     Types: Snuff    Tobacco comments:     social   Substance and Sexual Activity    Alcohol use: Yes     Alcohol/week: 0.0 standard drinks of alcohol     Comment: occasional    Drug use: No     Social Determinants of Health     Transportation Needs: No Transportation Needs (8/10/2023)    PRAPARE - Transportation     Lack of Transportation (Medical): No     Lack of Transportation (Non-Medical): No     ROS: Review of Systems   Constitutional:  Negative for chills, fever, malaise/fatigue and weight loss.   Respiratory:  Negative for cough.    Cardiovascular:  Negative for chest pain.  "  Gastrointestinal:  Negative for abdominal pain, blood in stool, constipation, diarrhea, heartburn, melena, nausea and vomiting.   All other systems reviewed and are negative.          Physical Exam:  /71 (BP Location: Right arm, Patient Position: Sitting, BP Method: Large (Automatic))   Pulse 71   Ht 5' 6" (1.676 m)   Wt 100.8 kg (222 lb 3.6 oz)   SpO2 95%   BMI 35.87 kg/m²   Physical Exam  Constitutional:       General: He is not in acute distress.     Appearance: He is obese. He is not ill-appearing or toxic-appearing.   Cardiovascular:      Rate and Rhythm: Normal rate and regular rhythm.   Pulmonary:      Effort: Pulmonary effort is normal. No respiratory distress.   Abdominal:      General: There is no distension.      Palpations: Abdomen is soft.      Tenderness: There is no abdominal tenderness. There is no guarding or rebound.   Skin:     General: Skin is warm and dry.      Capillary Refill: Capillary refill takes less than 2 seconds.   Neurological:      Mental Status: He is alert.               A/P: Pt is a 58 y.o.male who presents for routine screening colonoscopy  --To Endo on 9/27 for colonoscopy  --Procedure in detail explained to patient;  including risks including but not limited to bleeding, infection, damage to surrounding structures, and perforation- patient voiced understanding  --CBC, CMP, CXR, EKG   --Bowel prep given - Suprep  --CLD day before procedure        Christina Watt MD  General Surgery   [phone]        "

## 2023-09-18 NOTE — H&P
[facility] H&P      Consult: Screening colonoscopy ; hemorrhoids   Consulting Service: Emeka Prince DO  Chief Complaint: None        HPI: Pt is a 58 y.o.male who presents for routine screening colonoscopy. Questionable history of CRC in brother - just knows he had a colostomy bag for some reason.  Has daily regular bowel movements.  Has history of internal hemorrhoids with episode of bleeding with bowel movements 4 months.  Denies melena, rectal bleeding or pain, change in bowel habits or unintended weight loss.  Denies CP, SOB, abdominal pain, nausea, vomiting, fevers, or chills.        PMH:   Past Medical History:   Diagnosis Date    Back pain     Obesity, unspecified     Unspecified hemorrhoids      PSH:   Past Surgical History:   Procedure Laterality Date    APPENDECTOMY      COLONOSCOPY N/A 9/30/2016    Procedure: COLONOSCOPY;  Surgeon: Luis Bogran-Reyes, MD;  Location: [facility];  Service: Endoscopy;  Laterality: N/A;    HERNIA REPAIR       Meds: See medication list;  No anticoagulation  ALL: Patient has no known allergies.  FHX: non contributory   SOC:   Social History     Socioeconomic History    Marital status: Single    Years of education: 11th   Tobacco Use    Smoking status: Former     Types: Cigarettes     Start date: 1988     Passive exposure: Never    Smokeless tobacco: Current     Types: Snuff    Tobacco comments:     social   Substance and Sexual Activity    Alcohol use: Yes     Alcohol/week: 0.0 standard drinks of alcohol     Comment: occasional    Drug use: No     Social Determinants of Health     Transportation Needs: No Transportation Needs (8/10/2023)    PRAPARE - Transportation     Lack of Transportation (Medical): No     Lack of Transportation (Non-Medical): No     ROS: Review of Systems   Constitutional:  Negative for chills, fever, malaise/fatigue and weight loss.   Respiratory:  Negative for cough.    Cardiovascular:  Negative for chest pain.  "  Gastrointestinal:  Negative for abdominal pain, blood in stool, constipation, diarrhea, heartburn, melena, nausea and vomiting.   All other systems reviewed and are negative.          Physical Exam:  /71 (BP Location: Right arm, Patient Position: Sitting, BP Method: Large (Automatic))   Pulse 71   Ht 5' 6" (1.676 m)   Wt 100.8 kg (222 lb 3.6 oz)   SpO2 95%   BMI 35.87 kg/m²   Physical Exam  Constitutional:       General: He is not in acute distress.     Appearance: He is obese. He is not ill-appearing or toxic-appearing.   Cardiovascular:      Rate and Rhythm: Normal rate and regular rhythm.   Pulmonary:      Effort: Pulmonary effort is normal. No respiratory distress.   Abdominal:      General: There is no distension.      Palpations: Abdomen is soft.      Tenderness: There is no abdominal tenderness. There is no guarding or rebound.   Skin:     General: Skin is warm and dry.      Capillary Refill: Capillary refill takes less than 2 seconds.   Neurological:      Mental Status: He is alert.               A/P: Pt is a 58 y.o.male who presents for routine screening colonoscopy  --To Endo on 9/27 for colonoscopy  --Procedure in detail explained to patient;  including risks including but not limited to bleeding, infection, damage to surrounding structures, and perforation- patient voiced understanding  --CBC, CMP, CXR, EKG   --Bowel prep given - Suprep  --CLD day before procedure        Christina Watt MD  General Surgery   [phone]        "

## 2023-09-21 ENCOUNTER — PATIENT MESSAGE (OUTPATIENT)
Dept: PRIMARY CARE CLINIC | Facility: CLINIC | Age: 58
End: 2023-09-21
Payer: MEDICAID

## 2023-09-21 RX ORDER — SODIUM CHLORIDE 9 MG/ML
INJECTION, SOLUTION INTRAVENOUS CONTINUOUS
Status: CANCELLED | OUTPATIENT
Start: 2023-09-21

## 2023-09-21 RX ORDER — SODIUM CHLORIDE 0.9 % (FLUSH) 0.9 %
10 SYRINGE (ML) INJECTION
Status: CANCELLED | OUTPATIENT
Start: 2023-09-21

## 2023-09-25 ENCOUNTER — LAB VISIT (OUTPATIENT)
Dept: LAB | Facility: HOSPITAL | Age: 58
End: 2023-09-25
Attending: STUDENT IN AN ORGANIZED HEALTH CARE EDUCATION/TRAINING PROGRAM
Payer: MEDICAID

## 2023-09-25 ENCOUNTER — OFFICE VISIT (OUTPATIENT)
Dept: PRIMARY CARE CLINIC | Facility: CLINIC | Age: 58
End: 2023-09-25
Payer: MEDICAID

## 2023-09-25 VITALS
DIASTOLIC BLOOD PRESSURE: 73 MMHG | BODY MASS INDEX: 35.68 KG/M2 | RESPIRATION RATE: 16 BRPM | HEIGHT: 66 IN | HEART RATE: 60 BPM | TEMPERATURE: 98 F | SYSTOLIC BLOOD PRESSURE: 130 MMHG | OXYGEN SATURATION: 95 % | WEIGHT: 222 LBS

## 2023-09-25 DIAGNOSIS — Z23 INFLUENZA VACCINE ADMINISTERED: ICD-10-CM

## 2023-09-25 DIAGNOSIS — N52.9 ERECTILE DYSFUNCTION, UNSPECIFIED ERECTILE DYSFUNCTION TYPE: ICD-10-CM

## 2023-09-25 DIAGNOSIS — E78.1 HYPERTRIGLYCERIDEMIA: Primary | ICD-10-CM

## 2023-09-25 DIAGNOSIS — E66.01 CLASS 2 SEVERE OBESITY WITH SERIOUS COMORBIDITY AND BODY MASS INDEX (BMI) OF 35.0 TO 35.9 IN ADULT, UNSPECIFIED OBESITY TYPE: ICD-10-CM

## 2023-09-25 DIAGNOSIS — Z11.3 ROUTINE SCREENING FOR STI (SEXUALLY TRANSMITTED INFECTION): ICD-10-CM

## 2023-09-25 DIAGNOSIS — F17.290 OTHER TOBACCO PRODUCT NICOTINE DEPENDENCE, UNCOMPLICATED: ICD-10-CM

## 2023-09-25 DIAGNOSIS — K59.00 CONSTIPATION, UNSPECIFIED CONSTIPATION TYPE: ICD-10-CM

## 2023-09-25 PROBLEM — Z28.21 IMMUNIZATION DECLINED: Status: RESOLVED | Noted: 2023-08-24 | Resolved: 2023-09-25

## 2023-09-25 LAB — TSH SERPL DL<=0.005 MIU/L-ACNC: 3.16 UIU/ML (ref 0.4–4)

## 2023-09-25 PROCEDURE — 3044F HG A1C LEVEL LT 7.0%: CPT | Mod: CPTII,,, | Performed by: STUDENT IN AN ORGANIZED HEALTH CARE EDUCATION/TRAINING PROGRAM

## 2023-09-25 PROCEDURE — 3044F PR MOST RECENT HEMOGLOBIN A1C LEVEL <7.0%: ICD-10-PCS | Mod: CPTII,,, | Performed by: STUDENT IN AN ORGANIZED HEALTH CARE EDUCATION/TRAINING PROGRAM

## 2023-09-25 PROCEDURE — 36415 COLL VENOUS BLD VENIPUNCTURE: CPT | Performed by: STUDENT IN AN ORGANIZED HEALTH CARE EDUCATION/TRAINING PROGRAM

## 2023-09-25 PROCEDURE — 90686 IIV4 VACC NO PRSV 0.5 ML IM: CPT | Mod: PBBFAC,PN

## 2023-09-25 PROCEDURE — 99214 OFFICE O/P EST MOD 30 MIN: CPT | Mod: PBBFAC,PN | Performed by: STUDENT IN AN ORGANIZED HEALTH CARE EDUCATION/TRAINING PROGRAM

## 2023-09-25 PROCEDURE — 99999PBSHW FLU VACCINE (QUAD) GREATER THAN OR EQUAL TO 3YO PRESERVATIVE FREE IM: Mod: PBBFAC,,,

## 2023-09-25 PROCEDURE — 1159F PR MEDICATION LIST DOCUMENTED IN MEDICAL RECORD: ICD-10-PCS | Mod: CPTII,,, | Performed by: STUDENT IN AN ORGANIZED HEALTH CARE EDUCATION/TRAINING PROGRAM

## 2023-09-25 PROCEDURE — 87389 HIV-1 AG W/HIV-1&-2 AB AG IA: CPT | Performed by: STUDENT IN AN ORGANIZED HEALTH CARE EDUCATION/TRAINING PROGRAM

## 2023-09-25 PROCEDURE — 3075F SYST BP GE 130 - 139MM HG: CPT | Mod: CPTII,,, | Performed by: STUDENT IN AN ORGANIZED HEALTH CARE EDUCATION/TRAINING PROGRAM

## 2023-09-25 PROCEDURE — 99999 PR PBB SHADOW E&M-EST. PATIENT-LVL IV: ICD-10-PCS | Mod: PBBFAC,,, | Performed by: STUDENT IN AN ORGANIZED HEALTH CARE EDUCATION/TRAINING PROGRAM

## 2023-09-25 PROCEDURE — 3008F PR BODY MASS INDEX (BMI) DOCUMENTED: ICD-10-PCS | Mod: CPTII,,, | Performed by: STUDENT IN AN ORGANIZED HEALTH CARE EDUCATION/TRAINING PROGRAM

## 2023-09-25 PROCEDURE — 99999 PR PBB SHADOW E&M-EST. PATIENT-LVL IV: CPT | Mod: PBBFAC,,, | Performed by: STUDENT IN AN ORGANIZED HEALTH CARE EDUCATION/TRAINING PROGRAM

## 2023-09-25 PROCEDURE — 3078F PR MOST RECENT DIASTOLIC BLOOD PRESSURE < 80 MM HG: ICD-10-PCS | Mod: CPTII,,, | Performed by: STUDENT IN AN ORGANIZED HEALTH CARE EDUCATION/TRAINING PROGRAM

## 2023-09-25 PROCEDURE — 84443 ASSAY THYROID STIM HORMONE: CPT | Performed by: STUDENT IN AN ORGANIZED HEALTH CARE EDUCATION/TRAINING PROGRAM

## 2023-09-25 PROCEDURE — 3078F DIAST BP <80 MM HG: CPT | Mod: CPTII,,, | Performed by: STUDENT IN AN ORGANIZED HEALTH CARE EDUCATION/TRAINING PROGRAM

## 2023-09-25 PROCEDURE — 86592 SYPHILIS TEST NON-TREP QUAL: CPT | Performed by: STUDENT IN AN ORGANIZED HEALTH CARE EDUCATION/TRAINING PROGRAM

## 2023-09-25 PROCEDURE — 1159F MED LIST DOCD IN RCRD: CPT | Mod: CPTII,,, | Performed by: STUDENT IN AN ORGANIZED HEALTH CARE EDUCATION/TRAINING PROGRAM

## 2023-09-25 PROCEDURE — 3075F PR MOST RECENT SYSTOLIC BLOOD PRESS GE 130-139MM HG: ICD-10-PCS | Mod: CPTII,,, | Performed by: STUDENT IN AN ORGANIZED HEALTH CARE EDUCATION/TRAINING PROGRAM

## 2023-09-25 PROCEDURE — 99214 PR OFFICE/OUTPT VISIT, EST, LEVL IV, 30-39 MIN: ICD-10-PCS | Mod: S$PBB,,, | Performed by: STUDENT IN AN ORGANIZED HEALTH CARE EDUCATION/TRAINING PROGRAM

## 2023-09-25 PROCEDURE — 99214 OFFICE O/P EST MOD 30 MIN: CPT | Mod: S$PBB,,, | Performed by: STUDENT IN AN ORGANIZED HEALTH CARE EDUCATION/TRAINING PROGRAM

## 2023-09-25 PROCEDURE — 99999PBSHW FLU VACCINE (QUAD) GREATER THAN OR EQUAL TO 3YO PRESERVATIVE FREE IM: ICD-10-PCS | Mod: PBBFAC,,,

## 2023-09-25 PROCEDURE — 3008F BODY MASS INDEX DOCD: CPT | Mod: CPTII,,, | Performed by: STUDENT IN AN ORGANIZED HEALTH CARE EDUCATION/TRAINING PROGRAM

## 2023-09-25 RX ORDER — OMEGA-3-ACID ETHYL ESTERS 1 G/1
2 CAPSULE, LIQUID FILLED ORAL 2 TIMES DAILY
Qty: 360 CAPSULE | Refills: 3 | Status: SHIPPED | OUTPATIENT
Start: 2023-09-25 | End: 2024-09-24

## 2023-09-25 NOTE — PROGRESS NOTES
[facility] Note    HPI:  Bob Cobian is a 58 y.o. male who presents today for Flu Vaccine (Patient here for flu vaccine)    Answers submitted by the patient for this visit:  Review of Systems Questionnaire (Submitted on 9/21/2023)  activity change: No  unexpected weight change: No  rhinorrhea: No  trouble swallowing: No  visual disturbance: No  chest tightness: No  polyuria: No  difficulty urinating: No  joint swelling: No  arthralgias: Yes  confusion: No  dysphoric mood: No    Denies appetite changes, vision problems.  Denies pain, burning, tingling on urination, difficulty voiding.   Denies fever, chills, excessive headaches, chest pain, palpitations, shortness of breath, abdominal pain, nausea, vomiting, diarrhea.   Review of Systems   HENT:  Negative for hearing loss.    Eyes:  Negative for discharge.   Respiratory:  Negative for wheezing.    Cardiovascular:  Negative for chest pain and palpitations.   Gastrointestinal:  Negative for blood in stool, constipation, diarrhea and vomiting.   Genitourinary:  Negative for hematuria and urgency.   Musculoskeletal:  Negative for neck pain.   Neurological:  Negative for weakness and headaches.   Endo/Heme/Allergies:  Negative for polydipsia.      A review of systems was performed and was negative except as noted above.    I personally reviewed allergies, past medical, surgical, social and family history and updated as appropriate.    Medications:    Current Outpatient Medications:     omega-3 acid ethyl esters (LOVAZA) 1 gram capsule, Take 2 capsules (2 g total) by mouth 2 (two) times daily., Disp: 360 capsule, Rfl: 3    psyllium husk 6 gram/6 gram Powd, Take 1 Dose by mouth 3 (three) times daily., Disp: 450 g, Rfl: 4     Health Maintenance:  Immunization History   Administered Date(s) Administered    COVID-19, MRNA, LN-S, PF (MODERNA FULL 0.5 ML DOSE) 03/09/2021, 04/06/2021    COVID-19, vector-nr, rS-Ad26, PF (Yash) 05/27/2021    Influenza  "10/15/2022    Influenza - Quadrivalent - PF *Preferred* (6 months and older) 01/26/2016, 12/19/2017, 09/24/2020, 09/25/2023    Td - PF (ADULT) 12/19/2017      Health Maintenance   Topic Date Due    Shingles Vaccine (1 of 2) Never done    Colorectal Cancer Screening  09/30/2026    TETANUS VACCINE  12/19/2027    Lipid Panel  08/10/2028    Hepatitis C Screening  Completed     Health Maintenance Topics with due status: Not Due       Topic Last Completion Date    Colorectal Cancer Screening 09/30/2016    TETANUS VACCINE 12/19/2017    Hemoglobin A1c (Diabetic Prevention Screening) 08/10/2023    Lipid Panel 08/10/2023     Health Maintenance Due   Topic Date Due    Shingles Vaccine (1 of 2) Never done     PHYSICAL EXAM:  Vitals:    09/25/23 1526   BP: 130/73   BP Location: Left arm   Patient Position: Sitting   BP Method: Large (Automatic)   Pulse: 60   Resp: 16   Temp: 98.4 °F (36.9 °C)   SpO2: 95%   Weight: 100.7 kg (222 lb)   Height: 5' 6" (1.676 m)     Body mass index is 35.83 kg/m².  Physical Exam  Vitals and nursing note reviewed.   Constitutional:       General: He is not in acute distress.     Appearance: Normal appearance. He is not ill-appearing or toxic-appearing.   HENT:      Head: Normocephalic and atraumatic.      Right Ear: External ear normal.      Left Ear: External ear normal.      Nose: Nose normal.      Mouth/Throat:      Mouth: Mucous membranes are moist.      Pharynx: Oropharynx is clear.   Eyes:      Extraocular Movements: Extraocular movements intact.      Conjunctiva/sclera: Conjunctivae normal.   Cardiovascular:      Rate and Rhythm: Normal rate and regular rhythm.      Pulses: Normal pulses.      Heart sounds: Normal heart sounds. No murmur heard.  Pulmonary:      Effort: Pulmonary effort is normal. No respiratory distress.      Breath sounds: Normal breath sounds. No wheezing or rales.   Abdominal:      General: Abdomen is flat. Bowel sounds are normal. There is no distension.      Palpations: " Abdomen is soft. There is no mass.      Tenderness: There is no abdominal tenderness. There is no right CVA tenderness, left CVA tenderness or guarding.   Genitourinary:     Penis: Normal.       Testes: Normal.      Comments: Testes descending, no swelling, no erythema, no tenderness throughout. No palpable inguinal nodes  Musculoskeletal:         General: Normal range of motion.      Cervical back: Normal range of motion and neck supple. No rigidity.      Right lower leg: No edema.      Left lower leg: No edema.   Skin:     General: Skin is warm and dry.      Capillary Refill: Capillary refill takes less than 2 seconds.   Neurological:      General: No focal deficit present.      Mental Status: He is alert and oriented to person, place, and time.      Motor: No weakness.      Gait: Gait normal.   Psychiatric:         Mood and Affect: Mood normal.         Behavior: Behavior normal.         Thought Content: Thought content normal.         Judgment: Judgment normal.        ASSESSMENT/PLAN:  1. Hypertriglyceridemia  Assessment & Plan:  8/10/23 , , HDL 42, LDL 77  Continue to follow heart healthy diet, whole vegetables, whole fruits, whole meats. Avoid processed foods, cut back on highly refined carbohydrates including breads, pasta, tortillas and starchy foods like white potato.    Also recommend avoiding all tobacco products and maintaining regular aerobic exercises.  - continue with daily fiber intake  - start omega 3 supplements  - f/u 4 months, repeat lipid panel    Orders:  -     omega-3 acid ethyl esters (LOVAZA) 1 gram capsule; Take 2 capsules (2 g total) by mouth 2 (two) times daily.  Dispense: 360 capsule; Refill: 3  -     psyllium husk 6 gram/6 gram Powd; Take 1 Dose by mouth 3 (three) times daily.  Dispense: 450 g; Refill: 4    2. Influenza vaccine administered  -     Influenza - Quadrivalent *Preferred* (6 months+) (PF)    3. Routine screening for STI (sexually transmitted infection)  -     RPR;  Future; Expected date: 09/25/2023  -     HIV 1/2 Ag/Ab (4th Gen); Future; Expected date: 09/25/2023  -     C. trachomatis/N. gonorrhoeae by AMP DNA [facility]; Urine; Future; Expected date: 09/25/2023    4. Erectile dysfunction, unspecified erectile dysfunction type  -     TSH; Future; Expected date: 09/25/2023    5. Constipation, unspecified constipation type  Assessment & Plan:  Daily hydration status is currently inadequate. He has reduced his sugary coke intake from 3 cans to 1-2 cans daily. Drinks only 1-2 bottles of 6 ounce bottles.   - To maintain adequate hydration, increase daily intake to 1.5-2L volume  - increase fiber in diet  - continue daily metamucil and stool softener  - f/u 4 weeks or sooner with no improvement      6. Class 2 severe obesity with serious comorbidity and body mass index (BMI) of 35.0 to 35.9 in adult, unspecified obesity type  Assessment & Plan:  Body mass index is 35.83 kg/m².  Obesity complicates all aspects of disease management from diagnostic modalities to treatment. Weight loss encouraged and health benefits explained to patient.   Wt Readings from Last 8 Encounters:   09/25/23 100.7 kg (222 lb)   09/18/23 100.8 kg (222 lb 3.6 oz)   08/24/23 96.6 kg (213 lb)   08/10/23 99.3 kg (219 lb)   06/08/23 98.4 kg (217 lb)   05/05/23 99.6 kg (219 lb 8 oz)   08/12/22 99.8 kg (220 lb)   05/17/22 100.6 kg (221 lb 12.5 oz)     Recommendations:   Stay physically active. As tolerated alternate resistance training with stretching and cardio. Goal of 150 minutes per week of moderate intensity activity or 7,500 - 10,000 steps per day. Follow the Mediterranean Diet. Include whole fresh fruits, vegetables, olive oil, seeds, nuts, whole grains, cold water fish, salmon, mackerel and lean cuts of meat.  Do not drink sugary/diet carbonated beverages. Decrease portion sizes slightly which will result in an approximately 500-calorie deficit. Avoid fast or fried and processed food, especially canned foods.  Avoid refined carbohydrates, white starchy foods, flour, white potato, bread, muffins, and cakes. Consider substituting one meal a day with a meal replacement such as Slim fast, lean cuisine, or weight watcher's. Follow a healthy diet that includes enough calcium, vitamin D and proteins for bone health.          7. Other tobacco product nicotine dependence, uncomplicated  Assessment & Plan:  Smoking snuff. Encouraged smoking cessation. Follow up evaluation with smoking cessation specialist.       Orders:  -     Ambulatory referral/consult to Smoking Cessation Program; Future; Expected date: 10/03/2023      Other than changes above, continue current medications and maintain follow up with specialists.      Follow up in about 3 months (around 12/25/2023) for Med recheck.   Recent Results (from the past 2016 hour(s))   Lipid Panel    Collection Time: 08/10/23  4:29 PM   Result Value Ref Range    Cholesterol 154 120 - 199 mg/dL    Triglycerides 173 (H) 30 - 150 mg/dL    HDL 42 40 - 75 mg/dL    LDL Cholesterol 77.4 63.0 - 159.0 mg/dL    HDL/Cholesterol Ratio 27.3 20.0 - 50.0 %    Total Cholesterol/HDL Ratio 3.7 2.0 - 5.0    Non-HDL Cholesterol 112 mg/dL   CBC Auto Differential    Collection Time: 08/10/23  4:29 PM   Result Value Ref Range    WBC 7.45 3.90 - 12.70 K/uL    RBC 4.65 4.60 - 6.20 M/uL    Hemoglobin 14.8 14.0 - 18.0 g/dL    Hematocrit 44.3 40.0 - 54.0 %    MCV 95 82 - 98 fL    MCH 31.8 (H) 27.0 - 31.0 pg    MCHC 33.4 32.0 - 36.0 g/dL    RDW 12.9 11.5 - 14.5 %    Platelets 215 150 - 450 K/uL    MPV 10.0 9.2 - 12.9 fL    Immature Granulocytes 0.3 0.0 - 0.5 %    Gran # (ANC) 3.3 1.8 - 7.7 K/uL    Immature Grans (Abs) 0.02 0.00 - 0.04 K/uL    Lymph # 3.2 1.0 - 4.8 K/uL    Mono # 0.7 0.3 - 1.0 K/uL    Eos # 0.3 0.0 - 0.5 K/uL    Baso # 0.09 0.00 - 0.20 K/uL    nRBC 0 0 /100 WBC    Gran % 43.6 38.0 - 73.0 %    Lymph % 42.6 18.0 - 48.0 %    Mono % 8.7 4.0 - 15.0 %    Eosinophil % 3.6 0.0 - 8.0 %    Basophil % 1.2 0.0  - 1.9 %    Differential Method Automated    Comprehensive Metabolic Panel    Collection Time: 08/10/23  4:29 PM   Result Value Ref Range    Sodium 139 136 - 145 mmol/L    Potassium 3.8 3.5 - 5.1 mmol/L    Chloride 103 95 - 110 mmol/L    CO2 28 23 - 29 mmol/L    Glucose 79 70 - 110 mg/dL    BUN 11 6 - 20 mg/dL    Creatinine 1.0 0.5 - 1.4 mg/dL    Calcium 8.8 8.7 - 10.5 mg/dL    Total Protein 7.5 6.0 - 8.4 g/dL    Albumin 3.7 3.5 - 5.2 g/dL    Total Bilirubin 1.2 (H) 0.1 - 1.0 mg/dL    Alkaline Phosphatase 47 (L) 55 - 135 U/L    AST 65 (H) 10 - 40 U/L     (H) 10 - 44 U/L    eGFR >60.0 >60 mL/min/1.73 m^2    Anion Gap 8 8 - 16 mmol/L   Hemoglobin A1C    Collection Time: 08/10/23  4:29 PM   Result Value Ref Range    Hemoglobin A1C 5.4 4.0 - 5.6 %    Estimated Avg Glucose 108 68 - 131 mg/dL   HIV 1/2 Ag/Ab (4th Gen)    Collection Time: 09/25/23  4:16 PM   Result Value Ref Range    HIV 1/2 Ag/Ab Non-reactive Non-reactive   TSH    Collection Time: 09/25/23  4:16 PM   Result Value Ref Range    TSH 3.160 0.400 - 4.000 uIU/mL         Emeka Prince DO  [facility]

## 2023-09-25 NOTE — ASSESSMENT & PLAN NOTE
Body mass index is 35.83 kg/m².  Obesity complicates all aspects of disease management from diagnostic modalities to treatment. Weight loss encouraged and health benefits explained to patient.   Wt Readings from Last 8 Encounters:   09/25/23 100.7 kg (222 lb)   09/18/23 100.8 kg (222 lb 3.6 oz)   08/24/23 96.6 kg (213 lb)   08/10/23 99.3 kg (219 lb)   06/08/23 98.4 kg (217 lb)   05/05/23 99.6 kg (219 lb 8 oz)   08/12/22 99.8 kg (220 lb)   05/17/22 100.6 kg (221 lb 12.5 oz)     Recommendations:   Stay physically active. As tolerated alternate resistance training with stretching and cardio. Goal of 150 minutes per week of moderate intensity activity or 7,500 - 10,000 steps per day. Follow the Mediterranean Diet. Include whole fresh fruits, vegetables, olive oil, seeds, nuts, whole grains, cold water fish, salmon, mackerel and lean cuts of meat.  Do not drink sugary/diet carbonated beverages. Decrease portion sizes slightly which will result in an approximately 500-calorie deficit. Avoid fast or fried and processed food, especially canned foods. Avoid refined carbohydrates, white starchy foods, flour, white potato, bread, muffins, and cakes. Consider substituting one meal a day with a meal replacement such as Slim fast, lean cuisine, or weight watcher's. Follow a healthy diet that includes enough calcium, vitamin D and proteins for bone health.

## 2023-09-26 ENCOUNTER — HOSPITAL ENCOUNTER (OUTPATIENT)
Dept: PULMONOLOGY | Facility: HOSPITAL | Age: 58
Discharge: HOME | End: 2023-09-26
Attending: STUDENT IN AN ORGANIZED HEALTH CARE EDUCATION/TRAINING PROGRAM
Payer: MEDICAID

## 2023-09-26 ENCOUNTER — ANESTHESIA EVENT (OUTPATIENT)
Dept: ENDOSCOPY | Facility: HOSPITAL | Age: 58
End: 2023-09-26
Payer: MEDICAID

## 2023-09-26 ENCOUNTER — HOSPITAL ENCOUNTER (OUTPATIENT)
Dept: PREADMISSION TESTING | Facility: HOSPITAL | Age: 58
Discharge: HOME | End: 2023-09-26
Attending: STUDENT IN AN ORGANIZED HEALTH CARE EDUCATION/TRAINING PROGRAM
Payer: MEDICAID

## 2023-09-26 ENCOUNTER — PATIENT MESSAGE (OUTPATIENT)
Dept: PRIMARY CARE CLINIC | Facility: CLINIC | Age: 58
End: 2023-09-26
Payer: MEDICAID

## 2023-09-26 VITALS — HEIGHT: 66 IN | WEIGHT: 222 LBS | BODY MASS INDEX: 35.68 KG/M2

## 2023-09-26 DIAGNOSIS — Z12.11 COLON CANCER SCREENING: ICD-10-CM

## 2023-09-26 DIAGNOSIS — Z01.818 PRE-OP TESTING: ICD-10-CM

## 2023-09-26 PROBLEM — F17.200 NICOTINE DEPENDENCE: Status: ACTIVE | Noted: 2023-09-26

## 2023-09-26 LAB
HIV 1+2 AB+HIV1 P24 AG SERPL QL IA: NORMAL
RPR SER QL: NORMAL

## 2023-09-26 PROCEDURE — 93010 ELECTROCARDIOGRAM REPORT: CPT | Mod: ,,, | Performed by: INTERNAL MEDICINE

## 2023-09-26 PROCEDURE — 93010 EKG 12-LEAD: ICD-10-PCS | Mod: ,,, | Performed by: INTERNAL MEDICINE

## 2023-09-26 PROCEDURE — 93005 ELECTROCARDIOGRAM TRACING: CPT

## 2023-09-26 RX ORDER — TADALAFIL 20 MG/1
20 TABLET ORAL DAILY
Qty: 20 TABLET | Refills: 1 | Status: SHIPPED | OUTPATIENT
Start: 2023-09-26

## 2023-09-26 NOTE — ASSESSMENT & PLAN NOTE
8/10/23 , , HDL 42, LDL 77  Continue to follow heart healthy diet, whole vegetables, whole fruits, whole meats. Avoid processed foods, cut back on highly refined carbohydrates including breads, pasta, tortillas and starchy foods like white potato.    Also recommend avoiding all tobacco products and maintaining regular aerobic exercises.  - continue with daily fiber intake  - start omega 3 supplements  - f/u 4 months, repeat lipid panel

## 2023-09-26 NOTE — ANESTHESIA PREPROCEDURE EVALUATION
09/26/2023  Bob Cobian is a 58 y.o., male.      Pre-op Assessment    I have reviewed the Patient Summary Reports.    I have reviewed the NPO Status.   I have reviewed the Medications.     Review of Systems  Anesthesia Hx:  No problems with previous Anesthesia  Denies Family Hx of Anesthesia complications.   Denies Personal Hx of Anesthesia complications.   Social:  Former Smoker    Cardiovascular:  Cardiovascular Normal  ECG has been reviewed.    Pulmonary:  Pulmonary Normal    Renal/:  Renal/ Normal     Hepatic/GI:  Hepatic/GI Normal    Neurological:  Neurology Normal    Endocrine:  Endocrine Normal      Lab Results   Component Value Date    WBC 7.45 08/10/2023    HGB 14.8 08/10/2023    HCT 44.3 08/10/2023    MCV 95 08/10/2023     08/10/2023     CMP  Sodium   Date Value Ref Range Status   08/10/2023 139 136 - 145 mmol/L Final     Potassium   Date Value Ref Range Status   08/10/2023 3.8 3.5 - 5.1 mmol/L Final     Chloride   Date Value Ref Range Status   08/10/2023 103 95 - 110 mmol/L Final     CO2   Date Value Ref Range Status   08/10/2023 28 23 - 29 mmol/L Final     Glucose   Date Value Ref Range Status   08/10/2023 79 70 - 110 mg/dL Final     BUN   Date Value Ref Range Status   08/10/2023 11 6 - 20 mg/dL Final     Creatinine   Date Value Ref Range Status   08/10/2023 1.0 0.5 - 1.4 mg/dL Final     Calcium   Date Value Ref Range Status   08/10/2023 8.8 8.7 - 10.5 mg/dL Final     Total Protein   Date Value Ref Range Status   08/10/2023 7.5 6.0 - 8.4 g/dL Final     Albumin   Date Value Ref Range Status   08/10/2023 3.7 3.5 - 5.2 g/dL Final     Total Bilirubin   Date Value Ref Range Status   08/10/2023 1.2 (H) 0.1 - 1.0 mg/dL Final     Comment:     For infants and newborns, interpretation of results should be based  on gestational age, weight and in agreement with  clinical  observations.    Premature Infant recommended reference ranges:  Up to 24 hours.............<8.0 mg/dL  Up to 48 hours............<12.0 mg/dL  3-5 days..................<15.0 mg/dL  6-29 days.................<15.0 mg/dL    For patients on Eltrombopag therapy, use of Dimension [place] TBIL is   not   recommended.       Alkaline Phosphatase   Date Value Ref Range Status   08/10/2023 47 (L) 55 - 135 U/L Final     AST   Date Value Ref Range Status   08/10/2023 65 (H) 10 - 40 U/L Final     ALT   Date Value Ref Range Status   08/10/2023 130 (H) 10 - 44 U/L Final     Anion Gap   Date Value Ref Range Status   08/10/2023 8 8 - 16 mmol/L Final     eGFR   Date Value Ref Range Status   08/10/2023 >60.0 >60 mL/min/1.73 m^2 Final       Physical Exam  General: Well nourished    Airway:  Mallampati: III / III  Mouth Opening: Normal  TM Distance: Normal  Tongue: Normal  Neck ROM: Normal ROM    Dental:  Periodontal disease    Chest/Lungs:  Clear to auscultation    Heart:  Rate: Normal  Rhythm: Regular Rhythm  Sounds: Normal        Anesthesia Plan  Type of Anesthesia, risks & benefits discussed:    Anesthesia Type: MAC  Intra-op Monitoring Plan: Standard ASA Monitors  Post Op Pain Control Plan: multimodal analgesia  Induction:  IV  Airway Plan: Direct  Informed Consent: Informed consent signed with the Patient and all parties understand the risks and agree with anesthesia plan.  All questions answered.   ASA Score: 3  Day of Surgery Review of History & Physical: I have interviewed and examined the patient. I have reviewed the patient's H&P dated: There are no significant changes.     Ready For Surgery From Anesthesia Perspective.     .

## 2023-09-26 NOTE — ASSESSMENT & PLAN NOTE
Smoking snuff. Encouraged smoking cessation. Follow up evaluation with smoking cessation specialist.

## 2023-09-26 NOTE — ASSESSMENT & PLAN NOTE
Daily hydration status is currently inadequate. He has reduced his sugary coke intake from 3 cans to 1-2 cans daily. Drinks only 1-2 bottles of 6 ounce bottles.   - To maintain adequate hydration, increase daily intake to 1.5-2L volume  - increase fiber in diet  - continue daily metamucil and stool softener  - f/u 4 weeks or sooner with no improvement

## 2023-09-26 NOTE — ASSESSMENT & PLAN NOTE
Lab Results   Component Value Date    TSH 3.160 09/25/2023   Complains of weakened rigidity, shortened period of holding erection.   Denies dizziness, excessive headache, chest pain, palpitations, shortness of breath.   TSH within normal limits, will repeat in 6-8 weeks with free T4 and serum testosterone.  - start tadalafil 10 mg orally prior to anticipated sexual activity  - f/u 6 weeks

## 2023-09-27 ENCOUNTER — HOSPITAL ENCOUNTER (OUTPATIENT)
Facility: HOSPITAL | Age: 58
Discharge: HOME | End: 2023-09-27
Attending: STUDENT IN AN ORGANIZED HEALTH CARE EDUCATION/TRAINING PROGRAM | Admitting: STUDENT IN AN ORGANIZED HEALTH CARE EDUCATION/TRAINING PROGRAM
Payer: MEDICAID

## 2023-09-27 ENCOUNTER — ANESTHESIA (OUTPATIENT)
Dept: ENDOSCOPY | Facility: HOSPITAL | Age: 58
End: 2023-09-27
Payer: MEDICAID

## 2023-09-27 VITALS
RESPIRATION RATE: 20 BRPM | OXYGEN SATURATION: 97 % | SYSTOLIC BLOOD PRESSURE: 138 MMHG | HEART RATE: 52 BPM | TEMPERATURE: 98 F | DIASTOLIC BLOOD PRESSURE: 86 MMHG

## 2023-09-27 DIAGNOSIS — Z12.11 COLON CANCER SCREENING: Primary | ICD-10-CM

## 2023-09-27 PROCEDURE — 45378 DIAGNOSTIC COLONOSCOPY: CPT | Performed by: STUDENT IN AN ORGANIZED HEALTH CARE EDUCATION/TRAINING PROGRAM

## 2023-09-27 PROCEDURE — 25000003 PHARM REV CODE 250: Performed by: STUDENT IN AN ORGANIZED HEALTH CARE EDUCATION/TRAINING PROGRAM

## 2023-09-27 PROCEDURE — 45378 PR COLONOSCOPY,DIAGNOSTIC: ICD-10-PCS | Mod: ,,, | Performed by: STUDENT IN AN ORGANIZED HEALTH CARE EDUCATION/TRAINING PROGRAM

## 2023-09-27 PROCEDURE — 37000009 HC ANESTHESIA EA ADD 15 MINS: Performed by: STUDENT IN AN ORGANIZED HEALTH CARE EDUCATION/TRAINING PROGRAM

## 2023-09-27 PROCEDURE — 37000008 HC ANESTHESIA 1ST 15 MINUTES: Performed by: STUDENT IN AN ORGANIZED HEALTH CARE EDUCATION/TRAINING PROGRAM

## 2023-09-27 PROCEDURE — 45378 DIAGNOSTIC COLONOSCOPY: CPT | Mod: ,,, | Performed by: STUDENT IN AN ORGANIZED HEALTH CARE EDUCATION/TRAINING PROGRAM

## 2023-09-27 RX ORDER — SODIUM CHLORIDE 0.9 % (FLUSH) 0.9 %
10 SYRINGE (ML) INJECTION
Status: DISCONTINUED | OUTPATIENT
Start: 2023-09-27 | End: 2023-09-27 | Stop reason: HOSPADM

## 2023-09-27 RX ORDER — LIDOCAINE HYDROCHLORIDE 10 MG/ML
INJECTION, SOLUTION INTRAVENOUS
Status: DISCONTINUED | OUTPATIENT
Start: 2023-09-27 | End: 2023-09-27

## 2023-09-27 RX ORDER — SODIUM CHLORIDE 9 MG/ML
INJECTION, SOLUTION INTRAVENOUS CONTINUOUS
Status: DISCONTINUED | OUTPATIENT
Start: 2023-09-27 | End: 2023-09-27 | Stop reason: HOSPADM

## 2023-09-27 RX ORDER — PROPOFOL 10 MG/ML
VIAL (ML) INTRAVENOUS
Status: DISCONTINUED | OUTPATIENT
Start: 2023-09-27 | End: 2023-09-27

## 2023-09-27 RX ADMIN — LIDOCAINE HYDROCHLORIDE 50 MG: 10 INJECTION, SOLUTION INTRAVENOUS at 10:09

## 2023-09-27 RX ADMIN — Medication 30 MG: at 10:09

## 2023-09-27 RX ADMIN — SODIUM CHLORIDE: 9 INJECTION, SOLUTION INTRAVENOUS at 09:09

## 2023-09-27 RX ADMIN — Medication 40 MG: at 10:09

## 2023-09-27 RX ADMIN — Medication 120 MG: at 10:09

## 2023-09-27 NOTE — DISCHARGE SUMMARY
[facility] - Endoscopy  Discharge Note  Short Stay    Procedure(s) (LRB):  COLONOSCOPY (N/A)      OUTCOME: Patient tolerated treatment/procedure well without complication and is now ready for discharge.    DISPOSITION: Home or Self Care    FINAL DIAGNOSIS:  Screen for colon cancer    FOLLOWUP: In clinic    DISCHARGE INSTRUCTIONS:    Discharge Procedure Orders   Diet Adult Regular     No driving until:     Notify your health care provider if you experience any of the following:  temperature >100.4     Notify your health care provider if you experience any of the following:  persistent nausea and vomiting or diarrhea     Notify your health care provider if you experience any of the following:  severe uncontrolled pain     Activity as tolerated        TIME SPENT ON DISCHARGE: 5 minutes

## 2023-09-27 NOTE — TRANSFER OF CARE
Anesthesia Transfer of Care Note    Patient: Bob Cobian    Procedure(s) Performed: Procedure(s) (LRB):  COLONOSCOPY (N/A)    Patient location: GI    Anesthesia Type: MAC    Transport from OR: Transported from OR on room air with adequate spontaneous ventilation    Post pain: adequate analgesia    Post assessment: no apparent anesthetic complications    Post vital signs: stable    Level of consciousness: awake    Nausea/Vomiting: no nausea/vomiting    Complications: none    Transfer of care protocol was followed      Last vitals:   HR 66  T 36.8  SPO2 92  RR 16  /62

## 2023-09-27 NOTE — INTERVAL H&P NOTE
Patient seen and examined.  No interval change since the below obtained H&P  Informed consent verified    NPO since midnight  Prep Suprep; last BM liquid/yellow  All questions and concerns addressed  To Endo for screening colonoscopy    Christina Watt MD  General Surgery   [phone]

## 2023-09-27 NOTE — OP NOTE
[facility] - OR Peri Services  General Surgery Department  Operative Note    SUMMARY     Date of Procedure: 9/27/2023    Procedure: Procedure(s) (LRB):  COLONOSCOPY:      Surgeon(s) and Role:  Christina Watt MD     Pre-Operative Diagnosis: Pre-Op Diagnosis Codes:     * Screen for colon cancer [Z12.11]    Post-Operative Diagnosis: Same         Anesthesia: Local MAC      Findings:  -No masses or polypoid lesions seen.    -There was normal mucosa with normal submucosal venous pattern.    -No vascular lesions were identified.    -Moderate sigmoid diverticular disease  -Grade 1 internal hemorrhoids with external component    Indications for the Procedure:  59yo male with no family history of colorectal cancer who presents for --screening colonoscopy for screening for colon cancer.    Operative Conduct in Detail:   The risks, benefits, and alternatives were thoroughly discussed with the patient. Despite the risks, the patient wished to proceed. Informed consent was obtained and it will scanned to the electronic chart.      The patient was placed on left lateral decubitus. After achieving moderate sedation, a digital rectal examination was performed; subsequently, a standard colonoscope was introduced through the anus and advanced without difficulty up to the cecum where terminal ileum and appendiceal orifice were visualized The scope was withdrawn slowly while the mucosa was carefully examined. The following findings were seen:    Bowel Preparation: fair  Rectal exam was normal with good rectal tone and no masses or blood detected in the rectal vault.   -No masses or polypoid lesions seen.    -There was normal mucosa with normal submucosal venous pattern.    -No vascular lesions were identified.    -Moderate sigmoid diverticular disease  -Grade 1 internal hemorrhoids with external component    Withdrawal time >6 minutes (if no biopsies/polypectomies obtained)      Complications: No    Estimated Blood Loss  (EBL): None             Specimens:   Specimens (From admission, onward)      None                   Condition: Good    Disposition: PACU - hemodynamically stable.    Recommendation:    Repeat colonoscopy in 10 years    Christina Watt MD  General Surgery   [phone]

## 2023-09-27 NOTE — LETTER
September 27, 2023    Bob Cobian  [facility] [address] [place] [id number] [facility] [address]  Phone: [phone]  Fax: [phone]   Dear Mr. Bob Coiban:    Thank you for choosing [facility] for your recent colonoscopy!  We strive to provide our patients with the highest quality preventative care because your health is worth it!    Your recent colonoscopy with Dr. Watt was normal.     Since you do not have a family history of colon or rectal cancer, it is recommended that you undergo another colonoscopy in 10 years.     I would also recommend colon cancer screening for any first-degree relatives (brothers, sisters, children, and parents) beginning at age 45. Make your family members aware of these results so that they can discuss screening with their physician    I would suggest you consider changing any health habits that might increase your chance of forming more precancerous polyps and thus colorectal cancer. You may lower your chance of developing future polyps and colorectal cancer by adopting healthy habits such as not smoking, maintaining a healthy body weight, being physically active, limiting red and processed meat (such as beef, cold cuts, arana, and hot dogs),minimizing alcohol intake (or avoiding alcohol altogether), and eating a diet with a lot of fruits and vegetables.    Please feel free to contact me at [phone] with any questions or concerns.    Thank you for allowing me to participate in your care!    Sincerely,     Christina Watt MD

## 2023-09-28 NOTE — ANESTHESIA POSTPROCEDURE EVALUATION
Anesthesia Post Evaluation    Patient: Bob Cobian    Procedure(s) Performed: Procedure(s) (LRB):  COLONOSCOPY (N/A)    Final Anesthesia Type: MAC      Patient location during evaluation: OPS  Patient participation: Yes- Able to Participate  Level of consciousness: awake  Post-procedure vital signs: reviewed and stable  Pain management: adequate  Airway patency: patent    PONV status at discharge: No PONV  Anesthetic complications: no      Cardiovascular status: blood pressure returned to baseline  Respiratory status: spontaneous ventilation  Hydration status: euvolemic  Follow-up not needed.          Vitals Value Taken Time   /86 09/27/23 1107   Temp 36.8 09/27/23 1107   Pulse 52 09/27/23 1107   Resp 20 09/27/23 1107   SpO2 97 % 09/27/23 1107         No case tracking events are documented in the log.      Pain/Ama Score: Ama Score: 10 (9/27/2023 11:10 AM)

## 2023-09-30 ENCOUNTER — PATIENT MESSAGE (OUTPATIENT)
Dept: ADMINISTRATIVE | Facility: OTHER | Age: 58
End: 2023-09-30
Payer: MEDICAID

## 2023-11-13 PROBLEM — Z13.1 SCREENING FOR DIABETES MELLITUS: Status: RESOLVED | Noted: 2023-08-10 | Resolved: 2023-11-13

## 2023-11-13 PROBLEM — Z00.00 WELL ADULT EXAM: Status: RESOLVED | Noted: 2023-08-10 | Resolved: 2023-11-13

## 2023-11-30 ENCOUNTER — CLINICAL SUPPORT (OUTPATIENT)
Dept: SMOKING CESSATION | Facility: CLINIC | Age: 58
End: 2023-11-30

## 2023-11-30 DIAGNOSIS — F17.200 NICOTINE DEPENDENCE: Primary | ICD-10-CM

## 2023-11-30 PROCEDURE — 99999 PR PBB SHADOW E&M-EST. PATIENT-LVL I: ICD-10-PCS | Mod: PBBFAC,,,

## 2023-11-30 PROCEDURE — 99404 PR PREVENT COUNSEL,INDIV,60 MIN: ICD-10-PCS | Mod: ,,,

## 2023-11-30 PROCEDURE — 99999 PR PBB SHADOW E&M-EST. PATIENT-LVL I: CPT | Mod: PBBFAC,,,

## 2023-11-30 PROCEDURE — 99404 PREV MED CNSL INDIV APPRX 60: CPT | Mod: ,,,

## 2023-11-30 RX ORDER — BUPROPION HYDROCHLORIDE 150 MG/1
150 TABLET, EXTENDED RELEASE ORAL 2 TIMES DAILY
Qty: 60 TABLET | Refills: 0 | Status: SHIPPED | OUTPATIENT
Start: 2023-11-30 | End: 2024-01-18

## 2023-11-30 RX ORDER — IBUPROFEN 200 MG
1 TABLET ORAL DAILY
Qty: 14 PATCH | Refills: 0 | Status: SHIPPED | OUTPATIENT
Start: 2023-11-30 | End: 2024-01-18

## 2023-11-30 RX ORDER — DM/P-EPHED/ACETAMINOPH/DOXYLAM 30-7.5/3
2 LIQUID (ML) ORAL
Qty: 72 LOZENGE | Refills: 0 | Status: SHIPPED | OUTPATIENT
Start: 2023-11-30 | End: 2023-12-14 | Stop reason: SDUPTHER

## 2023-12-14 ENCOUNTER — CLINICAL SUPPORT (OUTPATIENT)
Dept: SMOKING CESSATION | Facility: CLINIC | Age: 58
End: 2023-12-14

## 2023-12-14 DIAGNOSIS — F17.200 NICOTINE DEPENDENCE: Primary | ICD-10-CM

## 2023-12-14 PROCEDURE — 99999 PR PBB SHADOW E&M-EST. PATIENT-LVL I: ICD-10-PCS | Mod: PBBFAC,,,

## 2023-12-14 PROCEDURE — 99402 PR PREVENT COUNSEL,INDIV,30 MIN: ICD-10-PCS | Mod: ,,,

## 2023-12-14 PROCEDURE — 99999 PR PBB SHADOW E&M-EST. PATIENT-LVL I: CPT | Mod: PBBFAC,,,

## 2023-12-14 PROCEDURE — 99402 PREV MED CNSL INDIV APPRX 30: CPT | Mod: ,,,

## 2023-12-14 RX ORDER — ASPIRIN/CALCIUM CARB/MAGNESIUM 325 MG
4 TABLET ORAL
Qty: 72 LOZENGE | Refills: 0 | Status: SHIPPED | OUTPATIENT
Start: 2023-12-14 | End: 2024-01-18

## 2023-12-14 NOTE — PROGRESS NOTES
"Individual Follow-Up Form    12/14/2023    Quit Date: TBD    Clinical Status of Patient: Outpatient    Length of Service: 30 minutes    Continuing Medication: yes  Wellbutrin    Other Medications: none     Target Symptoms: Withdrawal and medication side effects. The following were  rated moderate (3) to severe (4) on TCRS:  Moderate (3): desire/crave tobacco, headaches  Severe (4): none    Comments: Spoke with patient regarding smoking cessation progress update. Patient dips Grizzly and 1 can usually lasts about 2 days. He has since cut back to 1 dip per day and was even able to go 1 whole week without dipping at all. Commended pt for effort thus far. New goal is to attempt quit as pt feels ready to try. Discussed preparations. Pt tried 14 mg nicotine patch but stopped using as he felt it did not work. Encouraged pt to try again. Pt taking 150 mg Wellbutrin SR QD. Pt to begin taking BID as prescribed. No adverse reactions noted at this time. He has not picked up lozenges from the pharmacy yet. Identified patient personal reason for wanting to quit as "he just feels it's time to quit." Reviewed learned addiction model, triggers, cues, and short/long term consequences of tobacco use. Pt identifies personal triggers as stress, driving, and when feeling overwhelmed or irritable. Discussed coping strategies for patient to try. Pt to continue with counseling in 3 weeks.    Diagnosis: F17.200    Next Visit: 3 weeks due to holidays    "

## 2023-12-22 DIAGNOSIS — F17.200 NICOTINE DEPENDENCE: ICD-10-CM

## 2023-12-24 NOTE — TELEPHONE ENCOUNTER
Refill Routing Note   Medication(s) are not appropriate for processing by [facility] for the following reason(s):        Responsible provider unclear    [facility] action(s):  Defer               Appointments  past 12m or future 3m with PCP    Date Provider   Last Visit   Visit date not found Magnolia Chauhan MD   Next Visit   Visit date not found Magnolia Chauhan MD   ED visits in past 90 days: 0        Note composed:10:30 PM 12/23/2023

## 2023-12-27 RX ORDER — BUPROPION HYDROCHLORIDE 150 MG/1
150 TABLET, EXTENDED RELEASE ORAL 2 TIMES DAILY
Qty: 180 TABLET | Refills: 1 | OUTPATIENT
Start: 2023-12-27 | End: 2024-12-26

## 2024-01-04 ENCOUNTER — TELEPHONE (OUTPATIENT)
Dept: SMOKING CESSATION | Facility: CLINIC | Age: 59
End: 2024-01-04
Payer: MEDICAID

## 2024-01-18 ENCOUNTER — CLINICAL SUPPORT (OUTPATIENT)
Dept: SMOKING CESSATION | Facility: CLINIC | Age: 59
End: 2024-01-18
Payer: COMMERCIAL

## 2024-01-18 DIAGNOSIS — F17.200 NICOTINE DEPENDENCE: Primary | ICD-10-CM

## 2024-02-15 ENCOUNTER — TELEPHONE (OUTPATIENT)
Dept: SMOKING CESSATION | Facility: CLINIC | Age: 59
End: 2024-02-15
Payer: MEDICAID

## 2024-02-22 ENCOUNTER — CLINICAL SUPPORT (OUTPATIENT)
Dept: SMOKING CESSATION | Facility: CLINIC | Age: 59
End: 2024-02-22

## 2024-02-22 DIAGNOSIS — F17.200 NICOTINE DEPENDENCE: Primary | ICD-10-CM

## 2024-02-22 NOTE — PROGRESS NOTES
Called pt to f/u on his 3 month smoking cessation quit status. Pt stated he remains tobacco free since December 2023. Congratulated him on his hard work and success. Informed him  of benefit period, phone follow ups, and contact information. Will complete smart form and will continue to follow up on quit #1 episode.

## 2024-05-01 ENCOUNTER — HOSPITAL ENCOUNTER (EMERGENCY)
Facility: HOSPITAL | Age: 59
Discharge: HOME | End: 2024-05-01
Attending: EMERGENCY MEDICINE
Payer: MEDICAID

## 2024-05-01 VITALS
TEMPERATURE: 99 F | OXYGEN SATURATION: 95 % | RESPIRATION RATE: 18 BRPM | HEART RATE: 73 BPM | HEIGHT: 66 IN | BODY MASS INDEX: 35.52 KG/M2 | SYSTOLIC BLOOD PRESSURE: 133 MMHG | WEIGHT: 221 LBS | DIASTOLIC BLOOD PRESSURE: 80 MMHG
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
## 2024-05-01 PROCEDURE — 99283 EMERGENCY DEPT VISIT LOW MDM: CPT

## 2024-05-01 RX ORDER — SULFAMETHOXAZOLE AND TRIMETHOPRIM 800; 160 MG/1; MG/1
1 TABLET ORAL 2 TIMES DAILY
Qty: 20 TABLET | Refills: 0 | Status: SHIPPED | OUTPATIENT
Start: 2024-05-01 | End: 2024-05-11

## 2024-05-01 NOTE — ED PROVIDER NOTES
Encounter Date: 2024       History     Chief Complaint   Patient presents with    Insect Bite     Insect bite back of left thigh for 2 weeks     This is a 58-year-old white male with noncontributory past medical history who presents to the emergency department with complaints insect bite that occurred 2 weeks ago.  Patient reports area was initially itchy and he reports accidentally scratching off the scab and has been picking at the area to attempt to squeeze pus.  Patient denies fever vomiting.      Review of patient's allergies indicates:  No Known Allergies  Past Medical History:   Diagnosis Date    Back pain     Immunization declined 2023    Obesity, unspecified     Teeth missing     Unspecified hemorrhoids     Wears partial dentures     UPPER     Past Surgical History:   Procedure Laterality Date    APPENDECTOMY      COLONOSCOPY N/A 2016    Procedure: COLONOSCOPY;  Surgeon: Luis Bogran-Reyes, MD;  Location: [facility];  Service: Endoscopy;  Laterality: N/A;    COLONOSCOPY N/A 2023    Procedure: COLONOSCOPY;  Surgeon: Christina Watt MD;  Location: [facility];  Service: General;  Laterality: N/A;  6th 0900    HERNIA REPAIR       Family History   Problem Relation Name Age of Onset    Cancer Mother      Diabetes Father      Hypertension Father          @90    Deep vein thrombosis Sister      Deep vein thrombosis Brother       Social History     Tobacco Use    Smoking status: Former     Current packs/day: 0.00     Average packs/day: 1 pack/day for 27.0 years (27.0 ttl pk-yrs)     Types: Cigarettes     Start date:      Quit date: 2015     Years since quittin.3     Passive exposure: Never    Smokeless tobacco: Former     Types: Snuff     Quit date: 2023   Substance Use Topics    Alcohol use: Yes     Alcohol/week: 0.0 standard drinks of alcohol     Comment: occasional    Drug use: No     Review of Systems   Constitutional:  Negative for fever.   Gastrointestinal:  Negative for  vomiting.   Skin:  Positive for wound.       Physical Exam     Initial Vitals [05/01/24 1136]   BP Pulse Resp Temp SpO2   133/80 73 18 99.2 °F (37.3 °C) 95 %      MAP       --         Physical Exam    Nursing note and vitals reviewed.  Constitutional: He appears well-developed and well-nourished. He is active. No distress.   HENT:   Head: Normocephalic and atraumatic.   Eyes: EOM are normal. Pupils are equal, round, and reactive to light.   Neck: Neck supple.   Normal range of motion.  Cardiovascular:  Normal rate.           Pulmonary/Chest: No respiratory distress.   Musculoskeletal:      Cervical back: Normal range of motion and neck supple.        Legs:      Neurological: He is alert and oriented to person, place, and time. GCS score is 15. GCS eye subscore is 4. GCS verbal subscore is 5. GCS motor subscore is 6.   Skin: Skin is warm and dry. Capillary refill takes less than 2 seconds.   Psychiatric: He has a normal mood and affect. His behavior is normal. Thought content normal.         ED Course   Procedures  Labs Reviewed - No data to display       Imaging Results    None          Medications - No data to display  Medical Decision Making  Risk  Prescription drug management.                                      Clinical Impression:  Final diagnoses:  [W57.XXXA] Insect bite, unspecified site, initial encounter (Primary)          ED Disposition Condition    Discharge Stable          ED Prescriptions       Medication Sig Dispense Start Date End Date Auth. Provider    sulfamethoxazole-trimethoprim 800-160mg (BACTRIM DS) 800-160 mg Tab Take 1 tablet by mouth 2 (two) times daily. for 10 days 20 tablet 5/1/2024 5/11/2024 Haleigh Slater NP          Follow-up Information       Follow up With Specialties Details Why Contact Info    Emeka Prince,  Family Medicine Schedule an appointment as soon as possible for a visit in 2 days for re-evaluation of today's complaint [address]   [address]  [place]  [place] [address]  [phone]               Haleigh Slater, NP  05/01/24 1207

## 2024-05-01 NOTE — DISCHARGE INSTRUCTIONS
Apply warm compresses to affected area several times daily.  Be sure to complete all antibiotics.  Avoid picking or squeezing affected area.  See your doctor in 1-2 days and return to the emergency department for worsening condition.

## 2024-05-30 ENCOUNTER — CLINICAL SUPPORT (OUTPATIENT)
Dept: SMOKING CESSATION | Facility: CLINIC | Age: 59
End: 2024-05-30

## 2024-05-30 DIAGNOSIS — F17.200 NICOTINE DEPENDENCE: Primary | ICD-10-CM

## 2024-05-30 NOTE — PROGRESS NOTES
Spoke with patient today in regard to smoking cessation progress for 6 month telephone follow up, he states no longer tobacco free. Patient states no interest in returning to the program at this time and would like to be called at a later date and time. Informed patient of benefit period, future follow ups, and contact information if any further help or support is needed. Will complete smart form for 6 month follow up on Quit attempt #1.

## 2024-08-13 ENCOUNTER — OFFICE VISIT (OUTPATIENT)
Dept: PRIMARY CARE CLINIC | Facility: CLINIC | Age: 59
End: 2024-08-13
Payer: MEDICAID

## 2024-08-13 ENCOUNTER — HOSPITAL ENCOUNTER (OUTPATIENT)
Dept: RADIOLOGY | Facility: HOSPITAL | Age: 59
Discharge: HOME | End: 2024-08-13
Attending: NURSE PRACTITIONER
Payer: MEDICAID

## 2024-08-13 VITALS
DIASTOLIC BLOOD PRESSURE: 70 MMHG | WEIGHT: 228 LBS | OXYGEN SATURATION: 94 % | BODY MASS INDEX: 36.64 KG/M2 | HEIGHT: 66 IN | HEART RATE: 70 BPM | TEMPERATURE: 98 F | RESPIRATION RATE: 18 BRPM | SYSTOLIC BLOOD PRESSURE: 128 MMHG

## 2024-08-13 DIAGNOSIS — L50.9 URTICARIA: Primary | ICD-10-CM

## 2024-08-13 DIAGNOSIS — E78.49 OTHER HYPERLIPIDEMIA: ICD-10-CM

## 2024-08-13 DIAGNOSIS — Z13.1 SCREENING FOR DIABETES MELLITUS (DM): ICD-10-CM

## 2024-08-13 DIAGNOSIS — R10.30 LOWER ABDOMINAL PAIN: ICD-10-CM

## 2024-08-13 DIAGNOSIS — M25.552 LEFT HIP PAIN: ICD-10-CM

## 2024-08-13 DIAGNOSIS — Z13.21 ENCOUNTER FOR VITAMIN DEFICIENCY SCREENING: ICD-10-CM

## 2024-08-13 DIAGNOSIS — Z23 NEED FOR ZOSTER VACCINATION: ICD-10-CM

## 2024-08-13 DIAGNOSIS — Z13.0 SCREENING FOR IRON DEFICIENCY ANEMIA: ICD-10-CM

## 2024-08-13 DIAGNOSIS — Z87.891 HISTORY OF CIGARETTE SMOKING: ICD-10-CM

## 2024-08-13 DIAGNOSIS — Z13.29 THYROID DISORDER SCREENING: ICD-10-CM

## 2024-08-13 DIAGNOSIS — E78.5 HYPERLIPIDEMIA, UNSPECIFIED HYPERLIPIDEMIA TYPE: ICD-10-CM

## 2024-08-13 DIAGNOSIS — Z01.00 ENCOUNTER FOR ROUTINE EYE AND VISION EXAMINATION: ICD-10-CM

## 2024-08-13 LAB
ALBUMIN SERPL BCP-MCNC: 3.3 G/DL (ref 3.5–5.2)
ALBUMIN/CREAT UR: 14.3 UG/MG (ref 0–30)
ALP SERPL-CCNC: 52 U/L (ref 55–135)
ALT SERPL W/O P-5'-P-CCNC: 143 U/L (ref 10–44)
ANION GAP SERPL CALC-SCNC: 12 MMOL/L (ref 3–11)
AST SERPL-CCNC: 59 U/L (ref 10–40)
BASOPHILS # BLD AUTO: 0.05 K/UL (ref 0–0.2)
BASOPHILS NFR BLD: 0.8 % (ref 0–1.9)
BILIRUB SERPL-MCNC: 0.8 MG/DL (ref 0.1–1)
BUN SERPL-MCNC: 8 MG/DL (ref 6–20)
CALCIUM SERPL-MCNC: 9.1 MG/DL (ref 8.7–10.5)
CHLORIDE SERPL-SCNC: 103 MMOL/L (ref 95–110)
CHOLEST SERPL-MCNC: 134 MG/DL (ref 120–199)
CHOLEST/HDLC SERPL: 3.5 {RATIO} (ref 2–5)
CO2 SERPL-SCNC: 26 MMOL/L (ref 23–29)
CREAT SERPL-MCNC: 0.8 MG/DL (ref 0.5–1.4)
CREAT UR-MCNC: 109 MG/DL (ref 23–375)
DIFFERENTIAL METHOD BLD: ABNORMAL
EOSINOPHIL # BLD AUTO: 0.2 K/UL (ref 0–0.5)
EOSINOPHIL NFR BLD: 3.6 % (ref 0–8)
ERYTHROCYTE [DISTWIDTH] IN BLOOD BY AUTOMATED COUNT: 12.9 % (ref 11.5–14.5)
EST. GFR  (NO RACE VARIABLE): >60 ML/MIN/1.73 M^2
ESTIMATED AVG GLUCOSE: 114 MG/DL (ref 68–131)
GLUCOSE SERPL-MCNC: 98 MG/DL (ref 70–110)
HBA1C MFR BLD: 5.6 % (ref 4–5.6)
HCT VFR BLD AUTO: 45.7 % (ref 40–54)
HDLC SERPL-MCNC: 38 MG/DL (ref 40–75)
HDLC SERPL: 28.4 % (ref 20–50)
HGB BLD-MCNC: 15.1 G/DL (ref 14–18)
IMM GRANULOCYTES # BLD AUTO: 0.03 K/UL (ref 0–0.04)
IMM GRANULOCYTES NFR BLD AUTO: 0.5 % (ref 0–0.5)
LDLC SERPL CALC-MCNC: 49.2 MG/DL (ref 63–159)
LYMPHOCYTES # BLD AUTO: 2.6 K/UL (ref 1–4.8)
LYMPHOCYTES NFR BLD: 42.2 % (ref 18–48)
MCH RBC QN AUTO: 31.3 PG (ref 27–31)
MCHC RBC AUTO-ENTMCNC: 33 G/DL (ref 32–36)
MCV RBC AUTO: 95 FL (ref 82–98)
MICROALBUMIN UR DL<=1MG/L-MCNC: 15.6 MG/L
MONOCYTES # BLD AUTO: 0.5 K/UL (ref 0.3–1)
MONOCYTES NFR BLD: 8.6 % (ref 4–15)
NEUTROPHILS # BLD AUTO: 2.7 K/UL (ref 1.8–7.7)
NEUTROPHILS NFR BLD: 44.3 % (ref 38–73)
NONHDLC SERPL-MCNC: 96 MG/DL
NRBC BLD-RTO: 0 /100 WBC
PLATELET # BLD AUTO: 209 K/UL (ref 150–450)
PMV BLD AUTO: 10.7 FL (ref 9.2–12.9)
POTASSIUM SERPL-SCNC: 3.5 MMOL/L (ref 3.5–5.1)
PROT SERPL-MCNC: 7.3 G/DL (ref 6–8.4)
RBC # BLD AUTO: 4.82 M/UL (ref 4.6–6.2)
SODIUM SERPL-SCNC: 141 MMOL/L (ref 136–145)
TRIGL SERPL-MCNC: 234 MG/DL (ref 30–150)
TSH SERPL DL<=0.005 MIU/L-ACNC: 2.48 UIU/ML (ref 0.4–4)
WBC # BLD AUTO: 6.14 K/UL (ref 3.9–12.7)

## 2024-08-13 PROCEDURE — 82306 VITAMIN D 25 HYDROXY: CPT | Performed by: NURSE PRACTITIONER

## 2024-08-13 PROCEDURE — 36415 COLL VENOUS BLD VENIPUNCTURE: CPT | Performed by: NURSE PRACTITIONER

## 2024-08-13 PROCEDURE — 84443 ASSAY THYROID STIM HORMONE: CPT | Performed by: NURSE PRACTITIONER

## 2024-08-13 PROCEDURE — 73502 X-RAY EXAM HIP UNI 2-3 VIEWS: CPT | Mod: TC,LT

## 2024-08-13 PROCEDURE — 80053 COMPREHEN METABOLIC PANEL: CPT | Performed by: NURSE PRACTITIONER

## 2024-08-13 PROCEDURE — 99999 PR PBB SHADOW E&M-EST. PATIENT-LVL V: CPT | Mod: PBBFAC,,, | Performed by: NURSE PRACTITIONER

## 2024-08-13 PROCEDURE — 99215 OFFICE O/P EST HI 40 MIN: CPT | Mod: PBBFAC,25 | Performed by: NURSE PRACTITIONER

## 2024-08-13 PROCEDURE — 36415 COLL VENOUS BLD VENIPUNCTURE: CPT | Mod: PBBFAC

## 2024-08-13 PROCEDURE — 80061 LIPID PANEL: CPT | Performed by: NURSE PRACTITIONER

## 2024-08-13 PROCEDURE — 83036 HEMOGLOBIN GLYCOSYLATED A1C: CPT | Performed by: NURSE PRACTITIONER

## 2024-08-13 PROCEDURE — 85025 COMPLETE CBC W/AUTO DIFF WBC: CPT | Performed by: NURSE PRACTITIONER

## 2024-08-13 PROCEDURE — 82570 ASSAY OF URINE CREATININE: CPT | Performed by: NURSE PRACTITIONER

## 2024-08-13 PROCEDURE — 99999PBSHW PR PBB SHADOW TECHNICAL ONLY FILED TO HB: Mod: PBBFAC,,,

## 2024-08-13 PROCEDURE — 82043 UR ALBUMIN QUANTITATIVE: CPT | Performed by: NURSE PRACTITIONER

## 2024-08-13 RX ORDER — ROSUVASTATIN CALCIUM 20 MG/1
20 TABLET, COATED ORAL DAILY
Qty: 90 TABLET | Refills: 3 | Status: SHIPPED | OUTPATIENT
Start: 2024-08-13 | End: 2025-08-13

## 2024-08-13 RX ORDER — TRIAMCINOLONE ACETONIDE 40 MG/ML
40 INJECTION, SUSPENSION INTRA-ARTICULAR; INTRAMUSCULAR
Status: COMPLETED | OUTPATIENT
Start: 2024-08-13 | End: 2024-08-13

## 2024-08-13 RX ADMIN — TRIAMCINOLONE ACETONIDE 40 MG: 40 INJECTION, SUSPENSION INTRA-ARTICULAR; INTRAMUSCULAR at 11:08

## 2024-08-13 NOTE — PROGRESS NOTES
[facility] Note    HPI:  Bob Cobian is a 59 y.o. male who presents today for Abdominal Pain and Rash (Pt here for rash on his back/ abd pain)         Review of Systems   HENT: Negative.     Eyes:  Positive for blurred vision.   Respiratory: Negative.     Cardiovascular: Negative.    Gastrointestinal:  Positive for abdominal pain. Melena: lower.  Genitourinary: Negative.    Musculoskeletal:  Positive for joint pain (left hip).   Skin:  Positive for itching and rash.   Neurological: Negative.    Endo/Heme/Allergies: Negative.    Psychiatric/Behavioral: Negative.        A review of systems was performed and was negative except as noted above.    I personally reviewed allergies, past medical, surgical, social and family history and updated as appropriate.    Medications:    Current Outpatient Medications:     omega-3 acid ethyl esters (LOVAZA) 1 gram capsule, Take 2 capsules (2 g total) by mouth 2 (two) times daily., Disp: 360 capsule, Rfl: 3    rosuvastatin (CRESTOR) 10 MG tablet, Take 1 tablet (10 mg total) by mouth once daily., Disp: 90 tablet, Rfl: 1    psyllium husk 6 gram/6 gram Powd, Take 1 Dose by mouth 3 (three) times daily. (Patient not taking: Reported on 8/13/2024), Disp: 450 g, Rfl: 4    tadalafiL (CIALIS) 20 MG Tab, Take 1 tablet (20 mg total) by mouth once daily. (Patient not taking: Reported on 8/13/2024), Disp: 20 tablet, Rfl: 1    Current Facility-Administered Medications:     triamcinolone acetonide injection 40 mg, 40 mg, Intramuscular, 1 time in Clinic/HOD,     varicella zoster (Shingrix) IM vaccine (>/= 51 yo), 0.5 mL, Intramuscular, 1 time in Clinic/HOD,      Health Maintenance:  Immunization History   Administered Date(s) Administered    COVID-19 MRNA, LN-S PF (MODERNA HALF 0.25 ML DOSE) 10/29/2021    COVID-19, MRNA, LN-S, PF (MODERNA FULL 0.5 ML DOSE) 03/09/2021, 04/06/2021    COVID-19, vector-nr, rS-Ad26, PF ([facility]) 05/27/2021    Influenza 10/15/2022    Influenza  "(FLUBLOK) - Quadrivalent - Recombinant - PF *Preferred* (egg allergy) 10/20/2021    Influenza - Quadrivalent - PF *Preferred* (6 months and older) 01/26/2016, 12/19/2017, 09/24/2020, 10/01/2022, 09/25/2023    Td - PF (ADULT) 12/19/2017      Health Maintenance   Topic Date Due    LDCT Lung Screen  Never done    Shingles Vaccine (1 of 2) Never done    TETANUS VACCINE  12/19/2027    Lipid Panel  08/10/2028    Colorectal Cancer Screening  09/27/2033    Hepatitis C Screening  Completed     Health Maintenance Topics with due status: Not Due       Topic Last Completion Date    TETANUS VACCINE 12/19/2017    Hemoglobin A1c (Diabetic Prevention Screening) 08/10/2023    Lipid Panel 08/10/2023    Influenza Vaccine 09/25/2023    Colorectal Cancer Screening 09/27/2023     Health Maintenance Due   Topic Date Due    LDCT Lung Screen  Never done    Shingles Vaccine (1 of 2) Never done    COVID-19 Vaccine (5 - 2023-24 season) 09/01/2023       PHYSICAL EXAM:  Vitals:    08/13/24 1029   BP: 128/70   BP Location: Left arm   Patient Position: Sitting   BP Method: Large (Automatic)   Pulse: 70   Resp: 18   Temp: 98.4 °F (36.9 °C)   TempSrc: Temporal   SpO2: (!) 94%   Weight: 103.4 kg (228 lb)   Height: 5' 6" (1.676 m)     Body mass index is 36.8 kg/m².  Physical Exam  Constitutional:       Appearance: Normal appearance.   Cardiovascular:      Rate and Rhythm: Normal rate and regular rhythm.      Heart sounds: Normal heart sounds.   Pulmonary:      Effort: Pulmonary effort is normal.      Breath sounds: Normal breath sounds.   Neurological:      General: No focal deficit present.      Mental Status: He is alert and oriented to person, place, and time.   Psychiatric:         Mood and Affect: Mood normal.         Behavior: Behavior normal.         Thought Content: Thought content normal.          ASSESSMENT/PLAN:  1. Urticaria  -     Ambulatory referral/consult to Dermatology; Future; Expected date: 08/13/2024  -     triamcinolone acetonide " injection 40 mg    2. History of cigarette smoking  -     CT Chest Lung Screening Low Dose; Future; Expected date: 08/13/2024    3. Lower abdominal pain  -     US Abdomen Complete; Future; Expected date: 08/13/2024    4. Hyperlipidemia, unspecified hyperlipidemia type  -     Lipid Panel; Future; Expected date: 08/13/2024    5. Thyroid disorder screening  -     TSH; Future; Expected date: 08/13/2024    6. Screening for iron deficiency anemia  -     CBC Auto Differential; Future; Expected date: 08/13/2024    7. Screening for diabetes mellitus (DM)  -     Comprehensive Metabolic Panel; Future; Expected date: 08/13/2024  -     Hemoglobin A1C; Future; Expected date: 08/13/2024  -     Microalbumin/Creatinine Ratio, Urine; Future; Expected date: 08/13/2024    8. Encounter for vitamin deficiency screening  -     Misc Sendout Test, Blood Vitamin D; Future; Expected date: 08/13/2024    9. Need for zoster vaccination  -     varicella zoster (Shingrix) IM vaccine (>/= 49 yo)    10. Encounter for routine eye and vision examination  -     Ambulatory referral/consult to Ophthalmology; Future; Expected date: 08/13/2024    11. Left hip pain  -     X-Ray Hip 2 or 3 views Left with Pelvis when performed; Future; Expected date: 08/13/2024        Other than changes above, continue current medications and maintain follow up with specialists.      Follow up in about 2 months (around 10/13/2024) for Shingrix.   No results found for this or any previous visit (from the past 2016 hour(s)).      Radha Goins, JARED  [facility]

## 2024-08-14 ENCOUNTER — HOSPITAL ENCOUNTER (OUTPATIENT)
Dept: RADIOLOGY | Facility: HOSPITAL | Age: 59
Discharge: HOME | End: 2024-08-14
Attending: NURSE PRACTITIONER
Payer: MEDICAID

## 2024-08-14 DIAGNOSIS — E55.9 VITAMIN D DEFICIENCY: Primary | ICD-10-CM

## 2024-08-14 DIAGNOSIS — R10.30 LOWER ABDOMINAL PAIN: ICD-10-CM

## 2024-08-14 PROBLEM — K76.0 HEPATIC STEATOSIS: Status: ACTIVE | Noted: 2024-08-14

## 2024-08-14 LAB — 25(OH)D3+25(OH)D2 SERPL-MCNC: 20 NG/ML (ref 30–96)

## 2024-08-14 PROCEDURE — 76700 US EXAM ABDOM COMPLETE: CPT | Mod: TC

## 2024-08-14 RX ORDER — ERGOCALCIFEROL 1.25 MG/1
50000 CAPSULE ORAL
Qty: 4 CAPSULE | Refills: 1 | Status: SHIPPED | OUTPATIENT
Start: 2024-08-14 | End: 2024-10-13

## 2024-08-16 DIAGNOSIS — N52.9 ERECTILE DYSFUNCTION, UNSPECIFIED ERECTILE DYSFUNCTION TYPE: ICD-10-CM

## 2024-08-16 RX ORDER — TADALAFIL 20 MG/1
20 TABLET ORAL DAILY
Qty: 20 TABLET | Refills: 1 | Status: SHIPPED | OUTPATIENT
Start: 2024-08-16

## 2024-08-28 ENCOUNTER — HOSPITAL ENCOUNTER (OUTPATIENT)
Dept: RADIOLOGY | Facility: HOSPITAL | Age: 59
Discharge: HOME | End: 2024-08-28
Attending: NURSE PRACTITIONER
Payer: MEDICAID

## 2024-08-28 DIAGNOSIS — Z87.891 HISTORY OF CIGARETTE SMOKING: ICD-10-CM

## 2024-08-28 PROCEDURE — 71271 CT THORAX LUNG CANCER SCR C-: CPT | Mod: TC

## 2024-10-15 ENCOUNTER — IMMUNIZATION (OUTPATIENT)
Dept: PRIMARY CARE CLINIC | Facility: CLINIC | Age: 59
End: 2024-10-15
Payer: MEDICAID

## 2024-10-15 DIAGNOSIS — Z23 INFLUENZA VACCINE ADMINISTERED: Primary | ICD-10-CM

## 2024-10-15 DIAGNOSIS — Z23 NEED FOR ZOSTER VACCINE: ICD-10-CM

## 2024-10-15 PROCEDURE — 90472 IMMUNIZATION ADMIN EACH ADD: CPT | Mod: PBBFAC

## 2024-10-15 PROCEDURE — 90656 IIV3 VACC NO PRSV 0.5 ML IM: CPT | Mod: PBBFAC

## 2024-10-15 PROCEDURE — 90471 IMMUNIZATION ADMIN: CPT | Mod: PBBFAC

## 2024-10-15 PROCEDURE — 99999PBSHW PR PBB SHADOW TECHNICAL ONLY FILED TO HB: Mod: PBBFAC,,,

## 2024-10-15 PROCEDURE — 90750 HZV VACC RECOMBINANT IM: CPT | Mod: PBBFAC

## 2024-10-15 RX ADMIN — INFLUENZA VIRUS VACCINE 0.5 ML: 15; 15; 15 SUSPENSION INTRAMUSCULAR at 10:10

## 2024-10-15 RX ADMIN — Medication 0.5 ML: at 10:10

## 2024-10-30 ENCOUNTER — OFFICE VISIT (OUTPATIENT)
Dept: PRIMARY CARE CLINIC | Facility: CLINIC | Age: 59
End: 2024-10-30
Payer: MEDICAID

## 2024-10-30 VITALS
SYSTOLIC BLOOD PRESSURE: 130 MMHG | HEART RATE: 69 BPM | RESPIRATION RATE: 16 BRPM | DIASTOLIC BLOOD PRESSURE: 65 MMHG | OXYGEN SATURATION: 93 % | HEIGHT: 66 IN | TEMPERATURE: 99 F | BODY MASS INDEX: 37.26 KG/M2 | WEIGHT: 231.81 LBS

## 2024-10-30 DIAGNOSIS — H66.001 NON-RECURRENT ACUTE SUPPURATIVE OTITIS MEDIA OF RIGHT EAR WITHOUT SPONTANEOUS RUPTURE OF TYMPANIC MEMBRANE: Primary | ICD-10-CM

## 2024-10-30 DIAGNOSIS — Z55.6 DIFFICULTY DEMONSTRATING HEALTH LITERACY: ICD-10-CM

## 2024-10-30 DIAGNOSIS — K59.00 CONSTIPATION, UNSPECIFIED CONSTIPATION TYPE: ICD-10-CM

## 2024-10-30 DIAGNOSIS — E66.812 CLASS 2 SEVERE OBESITY DUE TO EXCESS CALORIES WITH SERIOUS COMORBIDITY AND BODY MASS INDEX (BMI) OF 37.0 TO 37.9 IN ADULT: ICD-10-CM

## 2024-10-30 DIAGNOSIS — K76.0 HEPATIC STEATOSIS: ICD-10-CM

## 2024-10-30 DIAGNOSIS — K57.30 DIVERTICULOSIS OF COLON: Chronic | ICD-10-CM

## 2024-10-30 DIAGNOSIS — F17.290 OTHER TOBACCO PRODUCT NICOTINE DEPENDENCE, UNCOMPLICATED: ICD-10-CM

## 2024-10-30 DIAGNOSIS — K64.0 FIRST DEGREE HEMORRHOIDS: ICD-10-CM

## 2024-10-30 DIAGNOSIS — E66.01 CLASS 2 SEVERE OBESITY DUE TO EXCESS CALORIES WITH SERIOUS COMORBIDITY AND BODY MASS INDEX (BMI) OF 37.0 TO 37.9 IN ADULT: ICD-10-CM

## 2024-10-30 DIAGNOSIS — E78.1 HYPERTRIGLYCERIDEMIA: ICD-10-CM

## 2024-10-30 PROCEDURE — 1159F MED LIST DOCD IN RCRD: CPT | Mod: CPTII,,, | Performed by: STUDENT IN AN ORGANIZED HEALTH CARE EDUCATION/TRAINING PROGRAM

## 2024-10-30 PROCEDURE — 99999 PR PBB SHADOW E&M-EST. PATIENT-LVL III: CPT | Mod: PBBFAC,,, | Performed by: STUDENT IN AN ORGANIZED HEALTH CARE EDUCATION/TRAINING PROGRAM

## 2024-10-30 PROCEDURE — 3044F HG A1C LEVEL LT 7.0%: CPT | Mod: CPTII,,, | Performed by: STUDENT IN AN ORGANIZED HEALTH CARE EDUCATION/TRAINING PROGRAM

## 2024-10-30 PROCEDURE — 99214 OFFICE O/P EST MOD 30 MIN: CPT | Mod: S$PBB,,, | Performed by: STUDENT IN AN ORGANIZED HEALTH CARE EDUCATION/TRAINING PROGRAM

## 2024-10-30 PROCEDURE — 3078F DIAST BP <80 MM HG: CPT | Mod: CPTII,,, | Performed by: STUDENT IN AN ORGANIZED HEALTH CARE EDUCATION/TRAINING PROGRAM

## 2024-10-30 PROCEDURE — 1160F RVW MEDS BY RX/DR IN RCRD: CPT | Mod: CPTII,,, | Performed by: STUDENT IN AN ORGANIZED HEALTH CARE EDUCATION/TRAINING PROGRAM

## 2024-10-30 PROCEDURE — 3075F SYST BP GE 130 - 139MM HG: CPT | Mod: CPTII,,, | Performed by: STUDENT IN AN ORGANIZED HEALTH CARE EDUCATION/TRAINING PROGRAM

## 2024-10-30 PROCEDURE — 3008F BODY MASS INDEX DOCD: CPT | Mod: CPTII,,, | Performed by: STUDENT IN AN ORGANIZED HEALTH CARE EDUCATION/TRAINING PROGRAM

## 2024-10-30 PROCEDURE — 99213 OFFICE O/P EST LOW 20 MIN: CPT | Mod: PBBFAC | Performed by: STUDENT IN AN ORGANIZED HEALTH CARE EDUCATION/TRAINING PROGRAM

## 2024-10-30 RX ORDER — AMOXICILLIN AND CLAVULANATE POTASSIUM 875; 125 MG/1; MG/1
1 TABLET, FILM COATED ORAL EVERY 12 HOURS
Qty: 10 TABLET | Refills: 0 | Status: SHIPPED | OUTPATIENT
Start: 2024-10-30 | End: 2024-11-04

## 2024-10-30 RX ORDER — CHOLECALCIFEROL (VITAMIN D3) 25 MCG
1000 TABLET ORAL DAILY
COMMUNITY

## 2024-10-30 SDOH — SOCIAL DETERMINANTS OF HEALTH (SDOH): PROBLEMS RELATED TO HEALTH LITERACY: Z55.6

## 2025-01-03 ENCOUNTER — HOSPITAL ENCOUNTER (EMERGENCY)
Facility: HOSPITAL | Age: 60
Discharge: HOME | End: 2025-01-03
Attending: EMERGENCY MEDICINE
Payer: MEDICAID

## 2025-01-03 VITALS
HEIGHT: 66 IN | SYSTOLIC BLOOD PRESSURE: 139 MMHG | RESPIRATION RATE: 18 BRPM | OXYGEN SATURATION: 98 % | HEART RATE: 76 BPM | BODY MASS INDEX: 35.68 KG/M2 | TEMPERATURE: 99 F | WEIGHT: 222 LBS | DIASTOLIC BLOOD PRESSURE: 87 MMHG

## 2025-01-03 DIAGNOSIS — S91.312A LACERATION OF LEFT FOOT, INITIAL ENCOUNTER: Primary | ICD-10-CM

## 2025-01-03 PROCEDURE — 63600175 PHARM REV CODE 636 W HCPCS

## 2025-01-03 PROCEDURE — 12001 RPR S/N/AX/GEN/TRNK 2.5CM/<: CPT

## 2025-01-03 PROCEDURE — 99283 EMERGENCY DEPT VISIT LOW MDM: CPT | Mod: 25

## 2025-01-03 RX ORDER — LIDOCAINE HYDROCHLORIDE 10 MG/ML
10 INJECTION, SOLUTION INFILTRATION; PERINEURAL
Status: COMPLETED | OUTPATIENT
Start: 2025-01-03 | End: 2025-01-03

## 2025-01-03 RX ADMIN — LIDOCAINE HYDROCHLORIDE 10 ML: 10 INJECTION, SOLUTION INFILTRATION; PERINEURAL at 06:01

## 2025-01-04 NOTE — DISCHARGE INSTRUCTIONS
Your tetanus is currently up-to-date you did not need another tetanus until the year 2027  Please have suture removed from left foot within the next 2 weeks

## 2025-01-04 NOTE — ED PROVIDER NOTES
"Encounter Date: 1/3/2025       History     Chief Complaint   Patient presents with    Laceration     Pt reports cutting L foot on side of big toe with a chainsaw when cutting tree branches on the ground Pt states "probably lost a pint of blood." Unknown if tetanus is up to date. Requesting another tetanus vaccine.     This note is dictated on M*Modal word recognition program.  There are word recognition mistakes and grammatical errors that are occasionally missed on review.     Bob Cobian is a 59 y.o. male with complaints of laceration to the lateral aspect of his left foot patient reports he was cutting up some treatments certain chain saw when the chain saw lacerated his left foot.  2 cm laceration noted with control bleeding at this time.      The history is provided by the patient.     Review of patient's allergies indicates:  No Known Allergies  Past Medical History:   Diagnosis Date    Back pain     Hepatic steatosis 8/14/2024    Immunization declined 08/24/2023    Obesity, unspecified     Teeth missing     Unspecified hemorrhoids     Wears partial dentures     UPPER     Past Surgical History:   Procedure Laterality Date    APPENDECTOMY      COLONOSCOPY N/A 9/30/2016    Procedure: COLONOSCOPY;  Surgeon: Luis Bogran-Reyes, MD;  Location: [facility];  Service: Endoscopy;  Laterality: N/A;    COLONOSCOPY N/A 9/27/2023    Procedure: COLONOSCOPY;  Surgeon: Christina Watt MD;  Location: [facility];  Service: General;  Laterality: N/A;  6th 0900    HERNIA REPAIR       Family History   Problem Relation Name Age of Onset    Cancer Mother      Diabetes Father      Hypertension Father          @90    Deep vein thrombosis Sister      Deep vein thrombosis Brother       Social History     Tobacco Use    Smoking status: Some Days     Current packs/day: 0.00     Average packs/day: 1 pack/day for 27.0 years (27.0 ttl pk-yrs)     Types: Cigarettes     Start date: 1988     Last attempt to quit: 2015     Years since " quitting: 10.0     Passive exposure: Never    Smokeless tobacco: Former     Types: Snuff     Quit date: 12/28/2023   Substance Use Topics    Alcohol use: Yes     Alcohol/week: 0.0 standard drinks of alcohol     Comment: occasional    Drug use: No     Review of Systems   Skin:  Positive for wound.   All other systems reviewed and are negative.      Physical Exam     Initial Vitals [01/03/25 1818]   BP Pulse Resp Temp SpO2   (!) 146/86 75 16 98.7 °F (37.1 °C) 98 %      MAP       --         Physical Exam    Constitutional: He appears well-developed and well-nourished.   HENT:   Head: Normocephalic.   Eyes: EOM are normal. Pupils are equal, round, and reactive to light.   Neck:   Normal range of motion.  Cardiovascular:  Normal rate.           No murmur heard.  Pulmonary/Chest: Breath sounds normal.   Abdominal: Abdomen is soft.   Musculoskeletal:      Cervical back: Normal range of motion.     Neurological: GCS score is 15. GCS eye subscore is 4. GCS verbal subscore is 5. GCS motor subscore is 6.   Skin:   Patient has laceration to left lateral foot with control bleeding please see photographic documentation left foot neurovascularly intact   Psychiatric: He has a normal mood and affect. Thought content normal.                   ED Course   Lac Repair    Date/Time: 1/3/2025 7:10 PM    Performed by: Heraclio Gu NP  Authorized by: Fuentes Gray DO    Consent:     Consent obtained:  Verbal    Consent given by:  Patient and parent    Risks discussed:  Infection, need for additional repair, nerve damage, poor wound healing, poor cosmetic result, pain, retained foreign body, tendon damage and vascular damage    Alternatives discussed:  No treatment, delayed treatment, observation and referral  Universal protocol:     Procedure explained and questions answered to patient or proxy's satisfaction: yes      Patient identity confirmed:  Verbally with patient  Laceration details:     Location:  Foot    Foot location:  Top of L  foot    Length (cm):  2  Exploration:     Limited defect created (wound extended): yes    Treatment:     Area cleansed with:  Povidone-iodine and chlorhexidine    Amount of cleaning:  Extensive    Irrigation volume:  500    Irrigation method:  Pressure wash and syringe    Debridement:  None  Skin repair:     Repair method:  Sutures    Suture size:  3-0    Suture technique:  Simple interrupted    Number of sutures:  3  Approximation:     Approximation:  Loose  Repair type:     Repair type:  Intermediate  Post-procedure details:     Procedure completion:  Tolerated well, no immediate complications  Comments:      Left foot wound was extensively cleaned soaked in saline and iodine for 10 minutes prior to closure.  Then irrigated with 500 cc of normal saline and peroxide.    Labs Reviewed - No data to display       Imaging Results    None          Medications   LIDOcaine HCL 10 mg/ml (1%) injection 10 mL (10 mLs Other Given 1/3/25 1847)     Medical Decision Making  Differential diagnosis include laceration, foot injury, avulsion injury    Patient had laceration of left foot.  Left foot was extensively cleaned prior to suture closure  Wound was loosely approximated.  Vital signs stable at discharge.  Home wound care discussed with patient.  Patient last had tetanus in year 2017 up-to-date  Suture removal discussed with patient in the next 14 days.  Patient stable at time of discharge in no acute distress.  No life-threatening illnesses were found during ER visit today.  Patient was instructed to follow-up with PCP or other recommended specialist within the next 48-72 hours.  Patient was instructed to return to ER immediately for any worsening or concerning symptoms.  All discharge instructions discussed with patient, and patient agrees to comply with discharge instructions given today.     Risk  Prescription drug management.                                      Clinical Impression:  Final diagnoses:  [Y47.404V]  Laceration of left foot, initial encounter (Primary)          ED Disposition Condition    Discharge Stable          ED Prescriptions    None       Follow-up Information    None          Heraclio Gu NP  01/03/25 8439

## 2025-01-08 ENCOUNTER — HOSPITAL ENCOUNTER (EMERGENCY)
Facility: HOSPITAL | Age: 60
Discharge: HOME | End: 2025-01-08
Attending: EMERGENCY MEDICINE
Payer: MEDICAID

## 2025-01-08 VITALS
HEIGHT: 66 IN | DIASTOLIC BLOOD PRESSURE: 81 MMHG | SYSTOLIC BLOOD PRESSURE: 150 MMHG | WEIGHT: 222 LBS | RESPIRATION RATE: 16 BRPM | TEMPERATURE: 98 F | HEART RATE: 68 BPM | BODY MASS INDEX: 35.68 KG/M2 | OXYGEN SATURATION: 98 %

## 2025-01-08 DIAGNOSIS — Z51.89 VISIT FOR WOUND CHECK: Primary | ICD-10-CM

## 2025-01-08 PROCEDURE — 99283 EMERGENCY DEPT VISIT LOW MDM: CPT

## 2025-01-08 RX ORDER — HYDROCODONE BITARTRATE AND ACETAMINOPHEN 5; 325 MG/1; MG/1
1 TABLET ORAL EVERY 6 HOURS PRN
Qty: 8 TABLET | Refills: 0 | Status: SHIPPED | OUTPATIENT
Start: 2025-01-08

## 2025-01-08 NOTE — ED PROVIDER NOTES
Encounter Date: 1/8/2025       History     Chief Complaint   Patient presents with    Suture / Staple Removal     Patient reports needing sutures removed from left foot     59-year-old male presents emergency room for possible suture removed from his left foot.  Patient had sutures placed on January 3rd after he cut himself with a chain saw.        Review of patient's allergies indicates:  No Known Allergies  Past Medical History:   Diagnosis Date    Back pain     Hepatic steatosis 8/14/2024    Immunization declined 08/24/2023    Obesity, unspecified     Teeth missing     Unspecified hemorrhoids     Wears partial dentures     UPPER     Past Surgical History:   Procedure Laterality Date    APPENDECTOMY      COLONOSCOPY N/A 9/30/2016    Procedure: COLONOSCOPY;  Surgeon: Luis Bogran-Reyes, MD;  Location: [facility];  Service: Endoscopy;  Laterality: N/A;    COLONOSCOPY N/A 9/27/2023    Procedure: COLONOSCOPY;  Surgeon: Christina Watt MD;  Location: [facility];  Service: General;  Laterality: N/A;  6th 0900    HERNIA REPAIR       Family History   Problem Relation Name Age of Onset    Cancer Mother      Diabetes Father      Hypertension Father          @90    Deep vein thrombosis Sister      Deep vein thrombosis Brother       Social History     Tobacco Use    Smoking status: Some Days     Current packs/day: 0.00     Average packs/day: 1 pack/day for 27.0 years (27.0 ttl pk-yrs)     Types: Cigarettes     Start date: 1988     Last attempt to quit: 2015     Years since quitting: 10.0     Passive exposure: Never    Smokeless tobacco: Former     Types: Snuff     Quit date: 12/28/2023   Substance Use Topics    Alcohol use: Yes     Alcohol/week: 0.0 standard drinks of alcohol     Comment: occasional    Drug use: No     Review of Systems   Constitutional:  Negative for fever.   HENT:  Negative for sore throat.    Respiratory:  Negative for shortness of breath.    Cardiovascular:  Negative for chest pain.   Gastrointestinal:   Negative for nausea.   Genitourinary:  Negative for dysuria.   Musculoskeletal:  Negative for back pain.   Skin:  Positive for wound. Negative for rash.   Neurological:  Negative for weakness.   Hematological:  Does not bruise/bleed easily.   All other systems reviewed and are negative.      Physical Exam     Initial Vitals [01/08/25 1058]   BP Pulse Resp Temp SpO2   (!) 150/81 68 16 98.3 °F (36.8 °C) 98 %      MAP       --         Physical Exam    Nursing note and vitals reviewed.  Constitutional: He appears well-developed and well-nourished.   HENT:   Head: Normocephalic and atraumatic.   Eyes: Pupils are equal, round, and reactive to light.   Musculoskeletal:         General: Normal range of motion.     Neurological: He is alert and oriented to person, place, and time.   Skin:   Three sutures intact, no redness, no drainage noted.   Psychiatric: He has a normal mood and affect.         ED Course   Procedures  Labs Reviewed - No data to display       Imaging Results    None          Medications - No data to display  Medical Decision Making                                    Clinical Impression:  Final diagnoses:  [Z51.89] Visit for wound check (Primary)          ED Disposition Condition    Discharge Stable          ED Prescriptions       Medication Sig Dispense Start Date End Date Auth. Provider    HYDROcodone-acetaminophen (NORCO) 5-325 mg per tablet Take 1 tablet by mouth every 6 (six) hours as needed for Pain. 8 tablet 1/8/2025 -- Mckayla Almazan NP          Follow-up Information       Follow up With Specialties Details Why Contact Info    Emeka Prince, DO Family Medicine  As needed [address] [facility]  [address]  [facility] [address]  [phone]               Mckayla Almazan NP  01/08/25 7906

## 2025-01-17 ENCOUNTER — OFFICE VISIT (OUTPATIENT)
Dept: PRIMARY CARE CLINIC | Facility: CLINIC | Age: 60
End: 2025-01-17
Payer: MEDICAID

## 2025-01-17 VITALS
OXYGEN SATURATION: 96 % | TEMPERATURE: 99 F | HEART RATE: 64 BPM | BODY MASS INDEX: 36.62 KG/M2 | DIASTOLIC BLOOD PRESSURE: 71 MMHG | RESPIRATION RATE: 16 BRPM | HEIGHT: 66 IN | WEIGHT: 227.88 LBS | SYSTOLIC BLOOD PRESSURE: 135 MMHG

## 2025-01-17 DIAGNOSIS — Z13.1 SCREENING FOR DIABETES MELLITUS (DM): ICD-10-CM

## 2025-01-17 DIAGNOSIS — E78.1 HYPERTRIGLYCERIDEMIA: ICD-10-CM

## 2025-01-17 DIAGNOSIS — Z48.02 ENCOUNTER FOR REMOVAL OF SUTURES: Primary | ICD-10-CM

## 2025-01-17 DIAGNOSIS — Z13.29 THYROID DISORDER SCREENING: ICD-10-CM

## 2025-01-17 DIAGNOSIS — Z13.0 SCREENING FOR IRON DEFICIENCY ANEMIA: ICD-10-CM

## 2025-01-17 DIAGNOSIS — Z23 NEED FOR PNEUMOCOCCAL 20-VALENT CONJUGATE VACCINATION: ICD-10-CM

## 2025-01-17 DIAGNOSIS — E55.9 VITAMIN D DEFICIENCY: ICD-10-CM

## 2025-01-17 LAB
ALBUMIN SERPL BCP-MCNC: 3.8 G/DL (ref 3.5–5.2)
ALP SERPL-CCNC: 45 U/L (ref 55–135)
ALT SERPL W/O P-5'-P-CCNC: 62 U/L (ref 10–44)
ANION GAP SERPL CALC-SCNC: 8 MMOL/L (ref 8–16)
AST SERPL-CCNC: 45 U/L (ref 10–40)
BASOPHILS # BLD AUTO: 0.03 K/UL (ref 0–0.2)
BASOPHILS NFR BLD: 0.5 % (ref 0–1.9)
BILIRUB SERPL-MCNC: 1.2 MG/DL (ref 0.1–1)
BUN SERPL-MCNC: 13 MG/DL (ref 6–20)
CALCIUM SERPL-MCNC: 9.1 MG/DL (ref 8.7–10.5)
CHLORIDE SERPL-SCNC: 105 MMOL/L (ref 95–110)
CHOLEST SERPL-MCNC: 135 MG/DL (ref 120–199)
CHOLEST/HDLC SERPL: 3.5 {RATIO} (ref 2–5)
CO2 SERPL-SCNC: 28 MMOL/L (ref 23–29)
CREAT SERPL-MCNC: 0.8 MG/DL (ref 0.5–1.4)
DIFFERENTIAL METHOD BLD: ABNORMAL
EOSINOPHIL # BLD AUTO: 0.2 K/UL (ref 0–0.5)
EOSINOPHIL NFR BLD: 3.4 % (ref 0–8)
ERYTHROCYTE [DISTWIDTH] IN BLOOD BY AUTOMATED COUNT: 13.2 % (ref 11.5–14.5)
EST. GFR  (NO RACE VARIABLE): >60 ML/MIN/1.73 M^2
GLUCOSE SERPL-MCNC: 65 MG/DL (ref 70–110)
HCT VFR BLD AUTO: 46.6 % (ref 40–54)
HDLC SERPL-MCNC: 39 MG/DL (ref 40–75)
HDLC SERPL: 28.9 % (ref 20–50)
HGB BLD-MCNC: 14.8 G/DL (ref 14–18)
IMM GRANULOCYTES # BLD AUTO: 0.03 K/UL (ref 0–0.04)
IMM GRANULOCYTES NFR BLD AUTO: 0.5 % (ref 0–0.5)
LDLC SERPL CALC-MCNC: 68.8 MG/DL (ref 63–159)
LYMPHOCYTES # BLD AUTO: 2.3 K/UL (ref 1–4.8)
LYMPHOCYTES NFR BLD: 36.8 % (ref 18–48)
MCH RBC QN AUTO: 31.6 PG (ref 27–31)
MCHC RBC AUTO-ENTMCNC: 31.8 G/DL (ref 32–36)
MCV RBC AUTO: 99 FL (ref 82–98)
MONOCYTES # BLD AUTO: 0.5 K/UL (ref 0.3–1)
MONOCYTES NFR BLD: 8.4 % (ref 4–15)
NEUTROPHILS # BLD AUTO: 3.1 K/UL (ref 1.8–7.7)
NEUTROPHILS NFR BLD: 50.4 % (ref 38–73)
NONHDLC SERPL-MCNC: 96 MG/DL
NRBC BLD-RTO: 0 /100 WBC
PLATELET # BLD AUTO: 241 K/UL (ref 150–450)
PMV BLD AUTO: 10 FL (ref 9.2–12.9)
POTASSIUM SERPL-SCNC: 4.1 MMOL/L (ref 3.5–5.1)
PROT SERPL-MCNC: 7.2 G/DL (ref 6–8.4)
RBC # BLD AUTO: 4.69 M/UL (ref 4.6–6.2)
SODIUM SERPL-SCNC: 141 MMOL/L (ref 136–145)
TRIGL SERPL-MCNC: 136 MG/DL (ref 30–150)
TSH SERPL DL<=0.005 MIU/L-ACNC: 3.33 UIU/ML (ref 0.4–4)
WBC # BLD AUTO: 6.22 K/UL (ref 3.9–12.7)

## 2025-01-17 PROCEDURE — 85025 COMPLETE CBC W/AUTO DIFF WBC: CPT | Performed by: STUDENT IN AN ORGANIZED HEALTH CARE EDUCATION/TRAINING PROGRAM

## 2025-01-17 PROCEDURE — 3078F DIAST BP <80 MM HG: CPT | Mod: CPTII,,, | Performed by: NURSE PRACTITIONER

## 2025-01-17 PROCEDURE — 82306 VITAMIN D 25 HYDROXY: CPT | Performed by: NURSE PRACTITIONER

## 2025-01-17 PROCEDURE — 90677 PCV20 VACCINE IM: CPT | Mod: PBBFAC

## 2025-01-17 PROCEDURE — 80053 COMPREHEN METABOLIC PANEL: CPT | Performed by: STUDENT IN AN ORGANIZED HEALTH CARE EDUCATION/TRAINING PROGRAM

## 2025-01-17 PROCEDURE — 99999 PR PBB SHADOW E&M-EST. PATIENT-LVL III: CPT | Mod: PBBFAC,,, | Performed by: NURSE PRACTITIONER

## 2025-01-17 PROCEDURE — 80061 LIPID PANEL: CPT | Performed by: STUDENT IN AN ORGANIZED HEALTH CARE EDUCATION/TRAINING PROGRAM

## 2025-01-17 PROCEDURE — 3008F BODY MASS INDEX DOCD: CPT | Mod: CPTII,,, | Performed by: NURSE PRACTITIONER

## 2025-01-17 PROCEDURE — 99213 OFFICE O/P EST LOW 20 MIN: CPT | Mod: PBBFAC | Performed by: NURSE PRACTITIONER

## 2025-01-17 PROCEDURE — 1160F RVW MEDS BY RX/DR IN RCRD: CPT | Mod: CPTII,,, | Performed by: NURSE PRACTITIONER

## 2025-01-17 PROCEDURE — 99215 OFFICE O/P EST HI 40 MIN: CPT | Mod: S$PBB,,, | Performed by: NURSE PRACTITIONER

## 2025-01-17 PROCEDURE — 99999PBSHW PR PBB SHADOW TECHNICAL ONLY FILED TO HB: Mod: PBBFAC,,,

## 2025-01-17 PROCEDURE — 1159F MED LIST DOCD IN RCRD: CPT | Mod: CPTII,,, | Performed by: NURSE PRACTITIONER

## 2025-01-17 PROCEDURE — 90471 IMMUNIZATION ADMIN: CPT | Mod: PBBFAC

## 2025-01-17 PROCEDURE — 3075F SYST BP GE 130 - 139MM HG: CPT | Mod: CPTII,,, | Performed by: NURSE PRACTITIONER

## 2025-01-17 PROCEDURE — 36415 COLL VENOUS BLD VENIPUNCTURE: CPT | Performed by: NURSE PRACTITIONER

## 2025-01-17 PROCEDURE — 84443 ASSAY THYROID STIM HORMONE: CPT | Performed by: STUDENT IN AN ORGANIZED HEALTH CARE EDUCATION/TRAINING PROGRAM

## 2025-01-17 PROCEDURE — 15853 REMOVAL SUTR/STAPL XREQ ANES: CPT | Mod: ,,, | Performed by: NURSE PRACTITIONER

## 2025-01-17 PROCEDURE — 36415 COLL VENOUS BLD VENIPUNCTURE: CPT | Mod: PBBFAC

## 2025-01-17 RX ADMIN — PNEUMOCOCCAL 20-VALENT CONJUGATE VACCINE 0.5 ML
2.2; 2.2; 2.2; 2.2; 2.2; 2.2; 2.2; 2.2; 2.2; 2.2; 2.2; 2.2; 2.2; 2.2; 2.2; 2.2; 4.4; 2.2; 2.2; 2.2 INJECTION, SUSPENSION INTRAMUSCULAR at 09:01

## 2025-01-17 NOTE — PROGRESS NOTES
[facility] Note    HPI:  Bob Cobian is a 59 y.o. male who presents today for Suture / Staple Removal (Pt here for suture removal)        Review of Systems   HENT:  Negative for hearing loss.    Eyes:  Negative for discharge.   Respiratory:  Negative for wheezing.    Cardiovascular:  Negative for chest pain and palpitations.   Gastrointestinal:  Positive for diarrhea. Negative for blood in stool, constipation and vomiting.   Genitourinary:  Negative for hematuria and urgency.   Musculoskeletal:  Negative for neck pain.   Neurological:  Positive for weakness. Negative for headaches.   Endo/Heme/Allergies:  Negative for polydipsia.      A review of systems was performed and was negative except as noted above.    I personally reviewed allergies, past medical, surgical, social and family history and updated as appropriate.    Medications:    Current Outpatient Medications:     HYDROcodone-acetaminophen (NORCO) 5-325 mg per tablet, Take 1 tablet by mouth every 6 (six) hours as needed for Pain., Disp: 8 tablet, Rfl: 0    omega-3 acid ethyl esters (LOVAZA) 1 gram capsule, Take 2 capsules (2 g total) by mouth 2 (two) times daily., Disp: 360 capsule, Rfl: 3    rosuvastatin (CRESTOR) 10 MG tablet, Take 1 tablet (10 mg total) by mouth once daily., Disp: 90 tablet, Rfl: 1    tadalafiL (CIALIS) 20 MG Tab, Take 1 tablet (20 mg total) by mouth once daily., Disp: 20 tablet, Rfl: 1    vitamin D (VITAMIN D3) 1000 units Tab, Take 1,000 Units by mouth once daily., Disp: , Rfl:   No current facility-administered medications for this visit.     Health Maintenance:  Immunization History   Administered Date(s) Administered    COVID-19 MRNA, LN-S PF (MODERNA HALF 0.25 ML DOSE) 10/29/2021    COVID-19, MRNA, LN-S, PF (MODERNA FULL 0.5 ML DOSE) 03/09/2021, 04/06/2021    COVID-19, vector-nr, rS-Ad26, PF (Yash) 05/27/2021    Influenza 10/15/2022    Influenza (FLUBLOK) - Quadrivalent - Recombinant - PF *Preferred* (egg  "allergy) 10/20/2021    Influenza - Quadrivalent - PF *Preferred* (6 months and older) 01/26/2016, 12/19/2017, 09/24/2020, 10/01/2022, 09/25/2023    Influenza - Trivalent - Fluarix, Flulaval, Fluzone, Afluria - PF 10/15/2024    Pneumococcal Conjugate - 20 Valent 01/17/2025    Td - PF (ADULT) 12/19/2017    Zoster Recombinant 08/13/2024, 10/15/2024      Health Maintenance   Topic Date Due    COVID-19 Vaccine (5 - 2024-25 season) 01/17/2026 (Originally 9/1/2024)    LDCT Lung Screen  08/28/2025    Hemoglobin A1c (Diabetic Prevention Screening)  08/13/2027    TETANUS VACCINE  12/19/2027    Lipid Panel  08/13/2029    Colorectal Cancer Screening  09/27/2033    RSV Vaccine (Age 60+ and Pregnant patients) (1 - 1-dose 75+ series) 06/03/2041    Hepatitis C Screening  Completed    Shingles Vaccine  Completed    Influenza Vaccine  Completed    HIV Screening  Completed    Pneumococcal Vaccines (Age 50+)  Completed     Health Maintenance Topics with due status: Not Due       Topic Last Completion Date    TETANUS VACCINE 12/19/2017    Colorectal Cancer Screening 09/27/2023    Hemoglobin A1c (Diabetic Prevention Screening) 08/13/2024    Lipid Panel 08/13/2024    LDCT Lung Screen 08/28/2024    RSV Vaccine (Age 60+ and Pregnant patients) Not Due     There are no preventive care reminders to display for this patient.      PHYSICAL EXAM:  Vitals:    01/17/25 0824   BP: 135/71   BP Location: Left arm   Patient Position: Sitting   Pulse: 64   Resp: 16   Temp: 98.7 °F (37.1 °C)   TempSrc: Temporal   SpO2: 96%   Weight: 103.4 kg (227 lb 14.4 oz)   Height: 5' 6" (1.676 m)     Body mass index is 36.78 kg/m².  Physical Exam  Constitutional:       Appearance: Normal appearance. He is obese.   Cardiovascular:      Rate and Rhythm: Normal rate and regular rhythm.      Heart sounds: Normal heart sounds.   Pulmonary:      Effort: Pulmonary effort is normal.      Breath sounds: Normal breath sounds.   Skin:     Comments: ~I cm laceration to bottom of " left foot with 3 sutures present with some purulent drainage   Neurological:      Mental Status: He is alert.          ASSESSMENT/PLAN:  1. Encounter for removal of sutures  -     SUTURE REMOVAL; Future  -     Suture Removal    2. Need for pneumococcal 20-valent conjugate vaccination  -     pneumoc 20-nessa conj-dip cr(PF) (PREVNAR-20 (PF)) injection Syrg 0.5 mL    3. Vitamin D deficiency  -     Misc Sendout Test, Blood Vitaminn D    4. Hypertriglyceridemia  -     Lipid Panel    5. Screening for iron deficiency anemia  -     CBC Auto Differential    6. Screening for diabetes mellitus (DM)  -     Comprehensive Metabolic Panel    7. Thyroid disorder screening  -     TSH    Apply antibiotic ointment to laceration BID for a few days    Other than changes above, continue current medications and maintain follow up with specialists.      Follow up if symptoms worsen or fail to improve.   No results found for this or any previous visit (from the past 12 weeks).      [facility]                  Answers submitted by the patient for this visit:  Review of Systems Questionnaire (Submitted on 1/16/2025)  activity change: No  unexpected weight change: No  neck pain: No  hearing loss: No  rhinorrhea: No  trouble swallowing: No  eye discharge: No  visual disturbance: No  chest tightness: No  wheezing: No  chest pain: No  palpitations: No  blood in stool: No  constipation: No  vomiting: No  diarrhea: Yes  polydipsia: No  polyuria: No  difficulty urinating: No  urgency: No  hematuria: No  joint swelling: No  arthralgias: Yes  headaches: No  weakness: Yes  confusion: No  dysphoric mood: No

## 2025-01-17 NOTE — PROCEDURES
Suture Removal    Date/Time: 1/17/2025 8:00 AM  Location procedure was performed: [facility]    Performed by: Radha Goins NP  Authorized by: Radha Goins NP  Body area: lower extremity  Location details: left foot  Wound Appearance: purulent  Sutures Removed: 3  Staples Removed: 0  Post-removal: dressing applied and antibiotic ointment applied  Complications: No  Estimated blood loss (mL): 0  Specimens: No  Implants: No

## 2025-01-18 DIAGNOSIS — E55.9 VITAMIN D DEFICIENCY: Primary | ICD-10-CM

## 2025-01-18 LAB — 25(OH)D3+25(OH)D2 SERPL-MCNC: 19 NG/ML (ref 30–96)

## 2025-01-18 RX ORDER — ERGOCALCIFEROL 1.25 MG/1
50000 CAPSULE ORAL
Qty: 8 CAPSULE | Refills: 1 | Status: SHIPPED | OUTPATIENT
Start: 2025-01-20 | End: 2025-03-21

## 2025-01-27 ENCOUNTER — PATIENT MESSAGE (OUTPATIENT)
Dept: PRIMARY CARE CLINIC | Facility: CLINIC | Age: 60
End: 2025-01-27
Payer: MEDICAID

## (undated) DEVICE — KIT VIA CUSTOM PROCEDURE

## (undated) DEVICE — CONNECTOR WATERJET ENDO

## (undated) DEVICE — KIT BIOGUARD AIR WTR SUC VALVE

## (undated) DEVICE — CANNULA SSOFT C02 MALE 14FT

## (undated) DEVICE — SYR SLIP TIP 60 CC DISP

## (undated) DEVICE — LINER SUCTION CANNISTER REGUGA

## (undated) DEVICE — SOL IRRI STRL WATER 1000ML

## (undated) DEVICE — UNDERPAD DISPOSABLE 30X30IN

## (undated) DEVICE — SPONGE DRY VIA GREEN

## (undated) DEVICE — GOWN ECLIPSE REINF LVL4 TWL XL

## (undated) DEVICE — ELECTRODE MEDI-TRACE 855 FOAM